# Patient Record
Sex: MALE | Race: WHITE | URBAN - METROPOLITAN AREA
[De-identification: names, ages, dates, MRNs, and addresses within clinical notes are randomized per-mention and may not be internally consistent; named-entity substitution may affect disease eponyms.]

---

## 2017-04-03 ENCOUNTER — HOSPITAL ENCOUNTER (OUTPATIENT)
Dept: GENERAL RADIOLOGY | Age: 67
Discharge: HOME | End: 2017-04-03
Attending: FAMILY MEDICINE
Payer: MEDICARE

## 2017-04-03 ENCOUNTER — OFFICE VISIT (OUTPATIENT)
Dept: FAMILY MEDICINE CLINIC | Facility: CLINIC | Age: 67
End: 2017-04-03

## 2017-04-03 VITALS
HEART RATE: 88 BPM | TEMPERATURE: 98 F | WEIGHT: 230 LBS | HEIGHT: 64.5 IN | BODY MASS INDEX: 38.79 KG/M2 | DIASTOLIC BLOOD PRESSURE: 90 MMHG | SYSTOLIC BLOOD PRESSURE: 164 MMHG | RESPIRATION RATE: 16 BRPM

## 2017-04-03 DIAGNOSIS — M54.5 LOW BACK PAIN, UNSPECIFIED BACK PAIN LATERALITY, UNSPECIFIED CHRONICITY, WITH SCIATICA PRESENCE UNSPECIFIED: Primary | ICD-10-CM

## 2017-04-03 DIAGNOSIS — M54.5 LOW BACK PAIN, UNSPECIFIED BACK PAIN LATERALITY, UNSPECIFIED CHRONICITY, WITH SCIATICA PRESENCE UNSPECIFIED: ICD-10-CM

## 2017-04-03 DIAGNOSIS — K40.90 UNILATERAL INGUINAL HERNIA WITHOUT OBSTRUCTION OR GANGRENE, RECURRENCE NOT SPECIFIED: ICD-10-CM

## 2017-04-03 DIAGNOSIS — K59.00 CONSTIPATION, UNSPECIFIED CONSTIPATION TYPE: ICD-10-CM

## 2017-04-03 DIAGNOSIS — I10 ESSENTIAL HYPERTENSION: ICD-10-CM

## 2017-04-03 DIAGNOSIS — R35.0 FREQUENCY OF URINATION: ICD-10-CM

## 2017-04-03 PROCEDURE — 99214 OFFICE O/P EST MOD 30 MIN: CPT | Performed by: FAMILY MEDICINE

## 2017-04-03 PROCEDURE — 72110 X-RAY EXAM L-2 SPINE 4/>VWS: CPT

## 2017-04-03 PROCEDURE — 81003 URINALYSIS AUTO W/O SCOPE: CPT | Performed by: FAMILY MEDICINE

## 2017-04-03 RX ORDER — LISINOPRIL 10 MG/1
1 TABLET ORAL DAILY
COMMUNITY
Start: 2012-10-11 | End: 2017-04-03

## 2017-04-03 RX ORDER — TRAMADOL HYDROCHLORIDE 50 MG/1
1 TABLET ORAL EVERY 6 HOURS PRN
COMMUNITY
Start: 2011-05-17 | End: 2017-04-03

## 2017-04-03 RX ORDER — HYDROCHLOROTHIAZIDE 12.5 MG/1
1 TABLET ORAL DAILY
COMMUNITY
End: 2017-04-28

## 2017-04-03 RX ORDER — LISINOPRIL 20 MG/1
20 TABLET ORAL DAILY
Qty: 90 TABLET | Refills: 3 | Status: SHIPPED | OUTPATIENT
Start: 2017-04-03 | End: 2017-04-28

## 2017-04-03 NOTE — PROGRESS NOTES
[facility] [place]  PROGRESS NOTE  Chief Complaint:   Patient presents with:  Low Back Pain      HPI:   This is a 61year old male coming in for low back pain for the past year. Occasionally has pain going down into the left leg.   No numbness o SYSTEMS:   CONSTITUTIONAL:  Denies unusual weight gain/loss. EENT:  Eyes:  Denies eye pain, visual loss, blurred vision, double vision or yellow sclerae. Ears, Nose, Throat:  Denies hearing loss, sneezing, congestion, runny nose or sore throat.   Mario Alexander back pain laterality, unspecified chronicity, with sciatica presence unspecified  -     XR LUMBAR SPINE (MIN 4 VIEWS) (CPT=72110);  Future    Frequency of urination  -     Urine Dip, auto without Micro    Unilateral inguinal hernia without obstruction or ga

## 2017-04-03 NOTE — PATIENT INSTRUCTIONS
1.  Low back pain: Physical therapy referral.  Recheck in 3 weeks. 2.  Inguinal hernia: Needs to see a surgeon. Would recommend Dr. Kristian Alaniz. Sister will call for an appointment.   This inguinal hernia may be causing some constipation and possibly some ur

## 2017-04-07 ENCOUNTER — TELEPHONE (OUTPATIENT)
Dept: FAMILY MEDICINE CLINIC | Facility: CLINIC | Age: 67
End: 2017-04-07

## 2017-04-14 ENCOUNTER — TELEPHONE (OUTPATIENT)
Dept: FAMILY MEDICINE CLINIC | Facility: CLINIC | Age: 67
End: 2017-04-14

## 2017-04-28 ENCOUNTER — OFFICE VISIT (OUTPATIENT)
Dept: FAMILY MEDICINE CLINIC | Facility: CLINIC | Age: 67
End: 2017-04-28

## 2017-04-28 ENCOUNTER — HOSPITAL ENCOUNTER (OUTPATIENT)
Dept: GENERAL RADIOLOGY | Age: 67
Discharge: HOME | End: 2017-04-28
Attending: FAMILY MEDICINE
Payer: MEDICARE

## 2017-04-28 ENCOUNTER — LAB ENCOUNTER (OUTPATIENT)
Dept: LAB | Age: 67
End: 2017-04-28
Attending: FAMILY MEDICINE
Payer: MEDICARE

## 2017-04-28 VITALS
HEART RATE: 88 BPM | HEIGHT: 64.5 IN | SYSTOLIC BLOOD PRESSURE: 160 MMHG | DIASTOLIC BLOOD PRESSURE: 92 MMHG | RESPIRATION RATE: 20 BRPM | TEMPERATURE: 98 F | WEIGHT: 233 LBS | BODY MASS INDEX: 39.3 KG/M2

## 2017-04-28 DIAGNOSIS — Z00.00 HEALTH CARE MAINTENANCE: Primary | ICD-10-CM

## 2017-04-28 DIAGNOSIS — R19.5 HEME POSITIVE STOOL: ICD-10-CM

## 2017-04-28 DIAGNOSIS — D64.9 ANEMIA, UNSPECIFIED TYPE: ICD-10-CM

## 2017-04-28 DIAGNOSIS — Z00.00 HEALTH CARE MAINTENANCE: ICD-10-CM

## 2017-04-28 DIAGNOSIS — N47.1 PHIMOSIS: ICD-10-CM

## 2017-04-28 DIAGNOSIS — Z01.818 PREOP EXAMINATION: ICD-10-CM

## 2017-04-28 DIAGNOSIS — K40.90 UNILATERAL INGUINAL HERNIA WITHOUT OBSTRUCTION OR GANGRENE, RECURRENCE NOT SPECIFIED: ICD-10-CM

## 2017-04-28 DIAGNOSIS — R73.9 HYPERGLYCEMIA: ICD-10-CM

## 2017-04-28 DIAGNOSIS — I10 ESSENTIAL HYPERTENSION: ICD-10-CM

## 2017-04-28 PROCEDURE — 82607 VITAMIN B-12: CPT

## 2017-04-28 PROCEDURE — 80053 COMPREHEN METABOLIC PANEL: CPT

## 2017-04-28 PROCEDURE — 83036 HEMOGLOBIN GLYCOSYLATED A1C: CPT

## 2017-04-28 PROCEDURE — 82746 ASSAY OF FOLIC ACID SERUM: CPT

## 2017-04-28 PROCEDURE — 83550 IRON BINDING TEST: CPT

## 2017-04-28 PROCEDURE — 82728 ASSAY OF FERRITIN: CPT

## 2017-04-28 PROCEDURE — 99214 OFFICE O/P EST MOD 30 MIN: CPT | Performed by: FAMILY MEDICINE

## 2017-04-28 PROCEDURE — 84443 ASSAY THYROID STIM HORMONE: CPT

## 2017-04-28 PROCEDURE — 83540 ASSAY OF IRON: CPT

## 2017-04-28 PROCEDURE — 71020 XR CHEST PA + LAT CHEST (CPT=71020): CPT

## 2017-04-28 PROCEDURE — 85025 COMPLETE CBC W/AUTO DIFF WBC: CPT

## 2017-04-28 PROCEDURE — 93000 ELECTROCARDIOGRAM COMPLETE: CPT | Performed by: FAMILY MEDICINE

## 2017-04-28 PROCEDURE — G0439 PPPS, SUBSEQ VISIT: HCPCS | Performed by: FAMILY MEDICINE

## 2017-04-28 RX ORDER — LISINOPRIL 20 MG/1
20 TABLET ORAL 2 TIMES DAILY
Qty: 180 TABLET | Refills: 3 | Status: SHIPPED | OUTPATIENT
Start: 2017-04-28 | End: 2017-06-05

## 2017-04-28 NOTE — PROGRESS NOTES
[facility] [place]  PROGRESS NOTE  Chief Complaint:   Patient presents with:  Physical      HPI:   This is a 61year old male coming in for general health check but also preoperative exam and recheck of problems.   Patient recently was seen by  Alcohol Use: No              Family History:  No family history on file.   Allergies:    No Known Allergies        Other                       Comment:Other reaction(s): Swelling             Peanuts: tongue swells             Fats: tongue swells (pork, lesions or ulcerations, good dentition. NECK: Supple, no CLAD, no JVD, no thyromegaly. SKIN: No rashes, no skin lesion, no bruising, good turgor. HEART:  Regular rate and rhythm, no murmurs, rubs or gallops.   LUNGS: Clear to auscultation bilterally, no with Dr. Nathalia Coffey as patient does have phimosis. I discussed his case with Dr. Nathalia Coffey today. He will see him hopefully this next week. Increase lisinopril to 20 mg twice daily and recheck appointment 2 weeks to better control blood pressure prior to surgery.

## 2017-04-28 NOTE — PATIENT INSTRUCTIONS
Scheduled for inguinal hernia surgery with Dr. Juana Dover on May 16. In preparation for the surgery an EKG was was done today which was normal.  A chest x-ray and blood tests have been also done with results pending. Physical exam was done today.   This incl

## 2017-05-01 ENCOUNTER — TELEPHONE (OUTPATIENT)
Dept: FAMILY MEDICINE CLINIC | Facility: CLINIC | Age: 67
End: 2017-05-01

## 2017-05-01 NOTE — TELEPHONE ENCOUNTER
Future Appointments  Date Time Provider Ila Wilson   5/12/2017 9:00 AM Mauricio Delaney MD [facility] [place] [facility]     Informed sister of the results of blood work. Faxed to Dr. Obey Roman and Dr. Armenta Samaritan office.  Sister expressed understanding and thank

## 2017-05-01 NOTE — TELEPHONE ENCOUNTER
----- Message from Miguelina Matta MD sent at 5/1/2017  1:48 PM CDT -----  Labs are normal with the exception of a slightly decreased hemoglobin and low iron levels.   Patient did have positive Hemoccult test on rectal.  I would like patient to start [facility]

## 2017-05-12 ENCOUNTER — OFFICE VISIT (OUTPATIENT)
Dept: FAMILY MEDICINE CLINIC | Facility: CLINIC | Age: 67
End: 2017-05-12

## 2017-05-12 VITALS
TEMPERATURE: 98 F | SYSTOLIC BLOOD PRESSURE: 150 MMHG | RESPIRATION RATE: 18 BRPM | DIASTOLIC BLOOD PRESSURE: 80 MMHG | BODY MASS INDEX: 38.16 KG/M2 | HEIGHT: 65.25 IN | WEIGHT: 231.81 LBS | HEART RATE: 80 BPM

## 2017-05-12 DIAGNOSIS — I10 ESSENTIAL HYPERTENSION: Primary | ICD-10-CM

## 2017-05-12 DIAGNOSIS — N47.1 PHIMOSIS: ICD-10-CM

## 2017-05-12 DIAGNOSIS — K40.90 UNILATERAL INGUINAL HERNIA WITHOUT OBSTRUCTION OR GANGRENE, RECURRENCE NOT SPECIFIED: ICD-10-CM

## 2017-05-12 DIAGNOSIS — Z01.818 PREOP EXAMINATION: ICD-10-CM

## 2017-05-12 PROCEDURE — 99214 OFFICE O/P EST MOD 30 MIN: CPT | Performed by: FAMILY MEDICINE

## 2017-05-12 RX ORDER — AMLODIPINE BESYLATE 5 MG/1
5 TABLET ORAL DAILY
Qty: 30 TABLET | Refills: 6 | Status: SHIPPED | OUTPATIENT
Start: 2017-05-12 | End: 2017-06-05

## 2017-05-12 NOTE — PROGRESS NOTES
[facility] [place]  PROGRESS NOTE  Chief Complaint:   Patient presents with:  Blood Pressure: re check b/p before hernia surgery      HPI:   This is a 61year old male coming in for ***      Results for orders placed or performed in visit on 04/ 0. 10-0.60 x10(3) uL   Eosinophil Absolute 0.44 (H) 0.00-0.30 x10(3) uL   Basophil Absolute 0.06 0.00-0.10 x10(3) uL   Immature Granulocyte Absolute 0.05 0.00-1.00 x10(3) uL   Neutrophil % 58.0 %   Lymphocyte % 24.1 %   Monocyte % 10.3 %   Eosinophil % 6.1 bruising, good turgor. HEART:  Regular rate and rhythm, no murmurs, rubs or gallops. LUNGS: Clear to auscultation bilterally, no rales/rhonchi/wheezing. CHEST: No tenderness.   ABDOMEN:  Soft, nondistended, nontender  EXTREMITIES:  No edema      ASSESSME

## 2017-05-12 NOTE — PATIENT INSTRUCTIONS
Continue with lisinopril 20 mg twice daily. Add amlodipine 5 mg daily. Will proceed with hernia surgery next week.

## 2017-05-12 NOTE — PROGRESS NOTES
[facility] [place]  PROGRESS NOTE  Chief Complaint:   Patient presents with:  Blood Pressure: re check b/p before hernia surgery      HPI:   This is a 61year old male coming in for preop for inguinal hernia and also recheck of blood pressure. 8. 7-24.0 ng/mL   -ASSAY, THYROID STIM HORMONE   Result Value Ref Range   TSH 1.060 0.350-5.500 mIU/mL   -CBC W/ DIFFERENTIAL   Result Value Ref Range   WBC 7.3 4.0-13.0 x10(3) uL   RBC 4.62 4.30-5.70 x10(6)uL   HGB 12.6 (L) 13.0-17.0 g/dL   HCT 40.0 37.0-5 Nose, Throat:  Denies hearing loss, sneezing, congestion, runny nose or sore throat. INTEGUMENTARY:  Denies rashes, itching, skin lesion, or excessive skin dryness.   CARDIOVASCULAR: See HPI  RESPIRATORY:  Denies shortness of breath, wheezing, cough or spu mg total) by mouth daily. PLAN: #1 preop: I see no contraindication proceeding with inguinal hernia repair. I discussed with  this morning.   It appears that it will be very difficult to coordinate repair of the inguinal hernia and repair

## 2017-05-19 ENCOUNTER — TELEPHONE (OUTPATIENT)
Dept: FAMILY MEDICINE CLINIC | Facility: CLINIC | Age: 67
End: 2017-05-19

## 2017-05-30 ENCOUNTER — TELEPHONE (OUTPATIENT)
Dept: FAMILY MEDICINE CLINIC | Facility: CLINIC | Age: 67
End: 2017-05-30

## 2017-05-30 NOTE — TELEPHONE ENCOUNTER
Patient's wife YENNY [facility] states patient is going to be having a circumcision done with Dr. Uvaldo Diaz on 6/27/2017. Wife states they were informed that clearance is needed by Dr. Estephanie Blandon.   Wife wondering if an appt is needed or if a note can be written as patient wa

## 2017-05-31 NOTE — TELEPHONE ENCOUNTER
Patient needs to be seen within 30 days for surgical clearance. So, I will need to see the patient for short visit prior to the surgery.   Please notify the patient's sister

## 2017-05-31 NOTE — TELEPHONE ENCOUNTER
Spoke with Mata Lopez  This is patient's sister NOT his wife  Made appt for patient to see Dr. Abe Vyas this Friday morning 6/2/17  Maribel states if they need to r/s she will call back later today    Mal Ibanez, 05/31/2017, 9:04 AM

## 2017-06-05 ENCOUNTER — OFFICE VISIT (OUTPATIENT)
Dept: FAMILY MEDICINE CLINIC | Facility: CLINIC | Age: 67
End: 2017-06-05

## 2017-06-05 VITALS
HEIGHT: 65.5 IN | DIASTOLIC BLOOD PRESSURE: 86 MMHG | BODY MASS INDEX: 37.17 KG/M2 | TEMPERATURE: 98 F | SYSTOLIC BLOOD PRESSURE: 136 MMHG | WEIGHT: 225.81 LBS | RESPIRATION RATE: 20 BRPM | HEART RATE: 88 BPM

## 2017-06-05 DIAGNOSIS — I10 ESSENTIAL HYPERTENSION: ICD-10-CM

## 2017-06-05 DIAGNOSIS — N47.1 PHIMOSIS: Primary | ICD-10-CM

## 2017-06-05 DIAGNOSIS — R35.0 FREQUENCY OF URINATION: ICD-10-CM

## 2017-06-05 PROBLEM — K40.90 UNILATERAL INGUINAL HERNIA WITHOUT OBSTRUCTION OR GANGRENE: Status: RESOLVED | Noted: 2017-04-28 | Resolved: 2017-06-05

## 2017-06-05 PROCEDURE — 99214 OFFICE O/P EST MOD 30 MIN: CPT | Performed by: FAMILY MEDICINE

## 2017-06-05 RX ORDER — AMLODIPINE BESYLATE 5 MG/1
5 TABLET ORAL DAILY
Qty: 90 TABLET | Refills: 3 | Status: SHIPPED | OUTPATIENT
Start: 2017-06-05 | End: 2018-06-24

## 2017-06-05 RX ORDER — LISINOPRIL 20 MG/1
20 TABLET ORAL 2 TIMES DAILY
Qty: 180 TABLET | Refills: 3 | Status: SHIPPED | OUTPATIENT
Start: 2017-06-05 | End: 2018-06-06

## 2017-06-05 NOTE — PROGRESS NOTES
[facility] [place]  PROGRESS NOTE  Chief Complaint:   Patient presents with:  Pre-Op Exam      HPI:   This is a 61year old male coming in for recheck of problems and preop evaluation for urological surgery.   Patient has seen Dr. Gelacio Hammond for phimosi Result Value Ref Range   TSH 1.060 0.350-5.500 mIU/mL   -CBC W/ DIFFERENTIAL   Result Value Ref Range   WBC 7.3 4.0-13.0 x10(3) uL   RBC 4.62 4.30-5.70 x10(6)uL   HGB 12.6 (L) 13.0-17.0 g/dL   HCT 40.0 37.0-53.0 %   .0 150.0-450.0 10(3)uL   MCV 86 congestion, runny nose or sore throat. INTEGUMENTARY:  Denies rashes, itching, skin lesion, or excessive skin dryness.   CARDIOVASCULAR:  Denies chest pain, chest pressure, chest discomfort, palpitations, edema, dyspnea on exertion or at rest.  RESPIRATORY No edema, no cyanosis,       ASSESSMENT AND PLAN:   Mel Melgar was seen today for pre-op exam.    Diagnoses and all orders for this visit:    Phimosis    Essential hypertension    Frequency of urination    Other orders  -     AmLODIPine Besylate 5 MG Oral Tab;

## 2017-09-06 ENCOUNTER — TELEPHONE (OUTPATIENT)
Dept: FAMILY MEDICINE CLINIC | Facility: CLINIC | Age: 67
End: 2017-09-06

## 2017-09-06 NOTE — TELEPHONE ENCOUNTER
I left a message for Madelyn Londons to let him know that he no showed for his appointment this morning and to call office if he needs to reschedule. I also left in the message our No Show Policy , next no show they will be charged $50.00.

## 2018-06-06 ENCOUNTER — APPOINTMENT (OUTPATIENT)
Dept: LAB | Age: 68
End: 2018-06-06
Attending: FAMILY MEDICINE
Payer: MEDICARE

## 2018-06-06 ENCOUNTER — OFFICE VISIT (OUTPATIENT)
Dept: FAMILY MEDICINE CLINIC | Facility: CLINIC | Age: 68
End: 2018-06-06

## 2018-06-06 VITALS
DIASTOLIC BLOOD PRESSURE: 78 MMHG | HEART RATE: 64 BPM | WEIGHT: 226.13 LBS | BODY MASS INDEX: 37.67 KG/M2 | SYSTOLIC BLOOD PRESSURE: 136 MMHG | RESPIRATION RATE: 18 BRPM | TEMPERATURE: 97 F | HEIGHT: 65 IN

## 2018-06-06 DIAGNOSIS — D64.9 ANEMIA, UNSPECIFIED TYPE: ICD-10-CM

## 2018-06-06 DIAGNOSIS — Z00.00 HEALTH CARE MAINTENANCE: Primary | ICD-10-CM

## 2018-06-06 DIAGNOSIS — R35.0 FREQUENCY OF URINATION: ICD-10-CM

## 2018-06-06 DIAGNOSIS — R73.09 OTHER ABNORMAL GLUCOSE: ICD-10-CM

## 2018-06-06 DIAGNOSIS — N40.1 BENIGN PROSTATIC HYPERPLASIA WITH LOWER URINARY TRACT SYMPTOMS, SYMPTOM DETAILS UNSPECIFIED: ICD-10-CM

## 2018-06-06 DIAGNOSIS — I10 ESSENTIAL HYPERTENSION: ICD-10-CM

## 2018-06-06 PROCEDURE — 82728 ASSAY OF FERRITIN: CPT | Performed by: FAMILY MEDICINE

## 2018-06-06 PROCEDURE — 80053 COMPREHEN METABOLIC PANEL: CPT | Performed by: FAMILY MEDICINE

## 2018-06-06 PROCEDURE — 80061 LIPID PANEL: CPT | Performed by: FAMILY MEDICINE

## 2018-06-06 PROCEDURE — 85025 COMPLETE CBC W/AUTO DIFF WBC: CPT | Performed by: FAMILY MEDICINE

## 2018-06-06 PROCEDURE — 90670 PCV13 VACCINE IM: CPT | Performed by: FAMILY MEDICINE

## 2018-06-06 PROCEDURE — 83036 HEMOGLOBIN GLYCOSYLATED A1C: CPT | Performed by: FAMILY MEDICINE

## 2018-06-06 PROCEDURE — 83550 IRON BINDING TEST: CPT | Performed by: FAMILY MEDICINE

## 2018-06-06 PROCEDURE — 84153 ASSAY OF PSA TOTAL: CPT | Performed by: FAMILY MEDICINE

## 2018-06-06 PROCEDURE — 84443 ASSAY THYROID STIM HORMONE: CPT | Performed by: FAMILY MEDICINE

## 2018-06-06 PROCEDURE — G0439 PPPS, SUBSEQ VISIT: HCPCS | Performed by: FAMILY MEDICINE

## 2018-06-06 PROCEDURE — G0009 ADMIN PNEUMOCOCCAL VACCINE: HCPCS | Performed by: FAMILY MEDICINE

## 2018-06-06 PROCEDURE — 83540 ASSAY OF IRON: CPT | Performed by: FAMILY MEDICINE

## 2018-06-06 PROCEDURE — 36415 COLL VENOUS BLD VENIPUNCTURE: CPT | Performed by: FAMILY MEDICINE

## 2018-06-06 PROCEDURE — 81003 URINALYSIS AUTO W/O SCOPE: CPT | Performed by: FAMILY MEDICINE

## 2018-06-06 RX ORDER — LISINOPRIL 20 MG/1
20 TABLET ORAL 2 TIMES DAILY
Qty: 180 TABLET | Refills: 2 | Status: SHIPPED | OUTPATIENT
Start: 2018-06-06 | End: 2019-04-09

## 2018-06-06 NOTE — PROGRESS NOTES
[facility] [place]  PROGRESS NOTE  Chief Complaint:   Patient presents with:  Physical      HPI:   This is a 74year old male coming in for general wellness check and recheck of problems of. Patient takes lisinopril for hypertension.   He feels THYROID STIM HORMONE   Result Value Ref Range   TSH 1.060 0.350 - 5.500 mIU/mL   -CBC W/ DIFFERENTIAL   Result Value Ref Range   WBC 7.3 4.0 - 13.0 x10(3) uL   RBC 4.62 4.30 - 5.70 x10(6)uL   HGB 12.6 (L) 13.0 - 17.0 g/dL   HCT 40.0 37.0 - 53.0 %    weight gain/loss,  EENT:  Eyes:  Denies eye pain, visual loss, blurred vision, double vision or yellow sclerae. Ears, Nose, Throat:  Denies hearing loss, sneezing, congestion, runny nose or sore throat.   INTEGUMENTARY:  Denies rashes, itching, skin lesion, rhythm, no murmurs, rubs or gallops. LUNGS: Clear to auscultation bilterally, no rales/rhonchi/wheezing. CHEST: No tenderness. ABDOMEN:  Soft, nondistended, nontender, no masses, no hepatosplenomegaly. BACK: No tenderness, .   EXTREMITIES:  No edema, no 04/28/2018  Annual Depression Screen due on 05/12/2018    Patient/Caregiver Education: Patient/Caregiver Education: There are no barriers to learning. Medical education done. Outcome: Patient verbalizes understanding.  Patient is notified to call with any

## 2018-06-06 NOTE — PATIENT INSTRUCTIONS
1.  Continue with blood pressure medication. 2.  Prevnar (pneumonia vaccine) given today. This is routine after the age of 72. Will need 1 more pneumonia vaccine in 1 year. 3.  Labs drawn today and results will be available tomorrow.   Will call patient

## 2018-06-07 ENCOUNTER — TELEPHONE (OUTPATIENT)
Dept: FAMILY MEDICINE CLINIC | Facility: CLINIC | Age: 68
End: 2018-06-07

## 2018-06-07 NOTE — TELEPHONE ENCOUNTER
----- Message from Miguelina Matta MD sent at 6/7/2018  7:32 AM CDT -----  Labs are improved from last year. A1c is better at 5.8, was 6.0 which means that his pre-diabetes/mild diabetes is better controlled.   His blood count/hemoglobin is improved from

## 2018-06-25 RX ORDER — PRASUGREL 10 MG/1
TABLET, FILM COATED ORAL
Status: COMPLETED
Start: 2018-06-25 | End: 2018-06-25

## 2018-06-25 RX ORDER — MIDAZOLAM HYDROCHLORIDE 1 MG/ML
INJECTION INTRAMUSCULAR; INTRAVENOUS
Status: COMPLETED
Start: 2018-06-25 | End: 2018-06-25

## 2018-06-25 RX ORDER — HEPARIN SODIUM 5000 [USP'U]/ML
INJECTION, SOLUTION INTRAVENOUS; SUBCUTANEOUS
Status: COMPLETED
Start: 2018-06-25 | End: 2018-06-25

## 2018-06-25 RX ORDER — AMLODIPINE BESYLATE 5 MG/1
TABLET ORAL
Qty: 90 TABLET | Refills: 2 | Status: SHIPPED | OUTPATIENT
Start: 2018-06-25 | End: 2019-04-09

## 2018-06-25 NOTE — TELEPHONE ENCOUNTER
Future appt:    Last Appointment:  6/6/2018    Cholesterol, Total (mg/dL)   Date Value   06/06/2018 159   ----------  HDL Cholesterol (mg/dL)   Date Value   06/06/2018 32 (L)   ----------  LDL Cholesterol (mg/dL)   Date Value   06/06/2018 84   ----------

## 2019-04-09 ENCOUNTER — TELEPHONE (OUTPATIENT)
Dept: FAMILY MEDICINE CLINIC | Facility: CLINIC | Age: 69
End: 2019-04-09

## 2019-04-09 RX ORDER — AMLODIPINE BESYLATE 5 MG/1
5 TABLET ORAL
Qty: 90 TABLET | Refills: 0 | Status: SHIPPED | OUTPATIENT
Start: 2019-04-09 | End: 2019-07-14

## 2019-04-09 RX ORDER — LISINOPRIL 20 MG/1
20 TABLET ORAL 2 TIMES DAILY
Qty: 180 TABLET | Refills: 0 | Status: SHIPPED | OUTPATIENT
Start: 2019-04-09 | End: 2019-07-14

## 2019-04-09 NOTE — TELEPHONE ENCOUNTER
Future appt:    Last Appointment:  6/6/18 physical with Steph Joshua; no f/u noted  Cholesterol, Total (mg/dL)   Date Value   06/06/2018 159     HDL Cholesterol (mg/dL)   Date Value   06/06/2018 32 (L)     LDL Cholesterol (mg/dL)   Date Value   06/06/2018 84

## 2019-04-09 NOTE — TELEPHONE ENCOUNTER
Patient states he takes both amlodipine and lisinopril. States he needs a refill of both to [facility]. Patient states he will call to set up a physical appointment with Dr. Copeland Doppelganger for sometime in June.

## 2019-06-25 ENCOUNTER — LAB ENCOUNTER (OUTPATIENT)
Dept: LAB | Age: 69
End: 2019-06-25
Attending: FAMILY MEDICINE
Payer: MEDICARE

## 2019-06-25 ENCOUNTER — OFFICE VISIT (OUTPATIENT)
Dept: FAMILY MEDICINE CLINIC | Facility: CLINIC | Age: 69
End: 2019-06-25
Payer: MEDICARE

## 2019-06-25 VITALS
RESPIRATION RATE: 18 BRPM | DIASTOLIC BLOOD PRESSURE: 82 MMHG | BODY MASS INDEX: 37.74 KG/M2 | HEIGHT: 65 IN | TEMPERATURE: 97 F | SYSTOLIC BLOOD PRESSURE: 138 MMHG | WEIGHT: 226.5 LBS | HEART RATE: 88 BPM

## 2019-06-25 DIAGNOSIS — D64.9 ANEMIA, UNSPECIFIED TYPE: ICD-10-CM

## 2019-06-25 DIAGNOSIS — E78.49 FAMILIAL MULTIPLE LIPOPROTEIN-TYPE HYPERLIPIDEMIA: ICD-10-CM

## 2019-06-25 DIAGNOSIS — Z13.6 SCREENING FOR CARDIOVASCULAR CONDITION: ICD-10-CM

## 2019-06-25 DIAGNOSIS — Z11.59 NEED FOR HEPATITIS C SCREENING TEST: ICD-10-CM

## 2019-06-25 DIAGNOSIS — I10 BENIGN ESSENTIAL HYPERTENSION: ICD-10-CM

## 2019-06-25 DIAGNOSIS — R35.1 BPH ASSOCIATED WITH NOCTURIA: ICD-10-CM

## 2019-06-25 DIAGNOSIS — Z00.00 ENCOUNTER FOR ANNUAL HEALTH EXAMINATION: Primary | ICD-10-CM

## 2019-06-25 DIAGNOSIS — M15.9 PRIMARY OSTEOARTHRITIS INVOLVING MULTIPLE JOINTS: ICD-10-CM

## 2019-06-25 DIAGNOSIS — Z12.5 SCREENING FOR MALIGNANT NEOPLASM OF PROSTATE: ICD-10-CM

## 2019-06-25 DIAGNOSIS — Z12.11 SCREENING FOR COLON CANCER: ICD-10-CM

## 2019-06-25 DIAGNOSIS — F79 INTELLECTUAL DISABILITY: ICD-10-CM

## 2019-06-25 DIAGNOSIS — Z12.5 SCREENING FOR PROSTATE CANCER: ICD-10-CM

## 2019-06-25 DIAGNOSIS — M51.16 LUMBAR DISC DISEASE WITH RADICULOPATHY: ICD-10-CM

## 2019-06-25 DIAGNOSIS — E66.09 CLASS 2 OBESITY DUE TO EXCESS CALORIES WITHOUT SERIOUS COMORBIDITY WITH BODY MASS INDEX (BMI) OF 37.0 TO 37.9 IN ADULT: ICD-10-CM

## 2019-06-25 DIAGNOSIS — Z23 NEED FOR VACCINATION: ICD-10-CM

## 2019-06-25 DIAGNOSIS — N40.1 BPH ASSOCIATED WITH NOCTURIA: ICD-10-CM

## 2019-06-25 PROCEDURE — G0009 ADMIN PNEUMOCOCCAL VACCINE: HCPCS | Performed by: FAMILY MEDICINE

## 2019-06-25 PROCEDURE — 84443 ASSAY THYROID STIM HORMONE: CPT

## 2019-06-25 PROCEDURE — 80053 COMPREHEN METABOLIC PANEL: CPT

## 2019-06-25 PROCEDURE — 86803 HEPATITIS C AB TEST: CPT

## 2019-06-25 PROCEDURE — 85025 COMPLETE CBC W/AUTO DIFF WBC: CPT

## 2019-06-25 PROCEDURE — 90732 PPSV23 VACC 2 YRS+ SUBQ/IM: CPT | Performed by: FAMILY MEDICINE

## 2019-06-25 PROCEDURE — 99213 OFFICE O/P EST LOW 20 MIN: CPT | Performed by: FAMILY MEDICINE

## 2019-06-25 PROCEDURE — G0439 PPPS, SUBSEQ VISIT: HCPCS | Performed by: FAMILY MEDICINE

## 2019-06-25 PROCEDURE — 36415 COLL VENOUS BLD VENIPUNCTURE: CPT

## 2019-06-25 PROCEDURE — 80061 LIPID PANEL: CPT

## 2019-06-25 NOTE — PATIENT INSTRUCTIONS
Recommended Websites for Advanced Directives    [employer]. org/publications/Documents/personal_dec. pdf  An information packet, including necessary form from the [address] website. http://www. [place]state. [place]/public/books/adv

## 2019-06-25 NOTE — PROGRESS NOTES
[address]  PROGRESS NOTE  Chief Complaint:   Patient presents with:  Physical  Establish Care: Former [facility] pt      HPI:   This is a 79year old male coming in for his annual wellness visit. He said that he is feeling good.   He de Result Value Ref Range    TSH 0.473 0.350 - 5.500 mIU/mL   PSA   Result Value Ref Range    PSA 0.35 0.01 - 4.00 ng/mL   URINALYSIS, ROUTINE   Result Value Ref Range    Urine Color Yellow Yellow    Clarity Urine Clear Clear    Spec Gravity 1.011 1.001 - 1 History:  Social History    Tobacco Use      Smoking status: Never Smoker      Smokeless tobacco: Never Used    Alcohol use: No      Alcohol/week: 0.0 oz    Drug use: No    Family History:  Family History   Problem Relation Age of Onset   • Other (Lung can or anxiety. ENDOCRINOLOGIC:  Denies excessive sweating, cold or heat intolerance, polyuria or polydipsia. ALLERGIES:  Denies allergic response, history of asthma, sneezing, hives, eczema or rhinitis.      EXAM:   /82 (BP Location: Right arm, Patient is due for his pneumococcal vaccine today. After this is completed he will not need any additional pneumonia vaccines. He is due for a colonoscopy so referral was made to Dr. Emi Araujo. Check fasting labs today.     2. Benign essential hypertension  His bl SCREEN/DIGITAL  - PSA SCREEN; Future    11. Screening for cardiovascular condition  We will check his lipids today. - LIPID PANEL; Future    12.  Screening for prostate cancer  He is due for prostate cancer screening.  - OFFICE/OUTPT VISIT,EST,LEVL III  -

## 2019-06-26 ENCOUNTER — TELEPHONE (OUTPATIENT)
Dept: FAMILY MEDICINE CLINIC | Facility: CLINIC | Age: 69
End: 2019-06-26

## 2019-06-26 NOTE — TELEPHONE ENCOUNTER
----- Message from Fabiana Reddy MD sent at 6/26/2019  8:07 AM CDT -----  Please call Jen Valdes. His labs look very good. He is testing for hepatitis C is negative. His cholesterol is normal at 181. His blood sugar is 105.   This is better than 1 year ago

## 2019-07-15 RX ORDER — AMLODIPINE BESYLATE 5 MG/1
5 TABLET ORAL
Qty: 90 TABLET | Refills: 0 | Status: SHIPPED | OUTPATIENT
Start: 2019-07-15 | End: 2019-10-18

## 2019-07-15 RX ORDER — LISINOPRIL 20 MG/1
20 TABLET ORAL 2 TIMES DAILY
Qty: 180 TABLET | Refills: 0 | Status: SHIPPED | OUTPATIENT
Start: 2019-07-15 | End: 2019-10-16

## 2019-07-15 NOTE — TELEPHONE ENCOUNTER
Future appt:    Last Appointment: 6/25/19 physical ; recheck 1 year  Cholesterol, Total (mg/dL)   Date Value   06/25/2019 181     HDL Cholesterol (mg/dL)   Date Value   06/25/2019 34 (L)     LDL Cholesterol (mg/dL)   Date Value   06/25/2019 91     Triglyce

## 2019-10-16 RX ORDER — LISINOPRIL 20 MG/1
TABLET ORAL
Qty: 180 TABLET | Refills: 1 | Status: SHIPPED | OUTPATIENT
Start: 2019-10-16 | End: 2020-04-30

## 2019-10-16 NOTE — TELEPHONE ENCOUNTER
Future appt:    Last Appointment:  6/25/2019  Cholesterol, Total (mg/dL)   Date Value   06/25/2019 181     HDL Cholesterol (mg/dL)   Date Value   06/25/2019 34 (L)     LDL Cholesterol (mg/dL)   Date Value   06/25/2019 91     Triglycerides (mg/dL)   Date Va

## 2019-10-18 RX ORDER — AMLODIPINE BESYLATE 5 MG/1
5 TABLET ORAL
Qty: 90 TABLET | Refills: 1 | Status: SHIPPED | OUTPATIENT
Start: 2019-10-18 | End: 2020-06-04

## 2020-01-30 ENCOUNTER — OFFICE VISIT (OUTPATIENT)
Dept: FAMILY MEDICINE CLINIC | Facility: CLINIC | Age: 70
End: 2020-01-30
Payer: MEDICARE

## 2020-01-30 VITALS
HEIGHT: 65 IN | SYSTOLIC BLOOD PRESSURE: 138 MMHG | OXYGEN SATURATION: 98 % | BODY MASS INDEX: 36.49 KG/M2 | DIASTOLIC BLOOD PRESSURE: 86 MMHG | RESPIRATION RATE: 22 BRPM | HEART RATE: 91 BPM | TEMPERATURE: 98 F | WEIGHT: 219 LBS

## 2020-01-30 DIAGNOSIS — M79.604 PAIN IN BOTH LOWER EXTREMITIES: Primary | ICD-10-CM

## 2020-01-30 DIAGNOSIS — M79.605 PAIN IN BOTH LOWER EXTREMITIES: Primary | ICD-10-CM

## 2020-01-30 PROCEDURE — 99213 OFFICE O/P EST LOW 20 MIN: CPT | Performed by: FAMILY MEDICINE

## 2020-01-30 NOTE — PATIENT INSTRUCTIONS
Recommend rest, heating pad and aleve. Recommend to rest after every 2 hours at work. Consider physical therapy if no improvement, declined today. Return to clinic in 1-2 weeks if no improvement. Sooner if symptoms gets worse.

## 2020-01-30 NOTE — PROGRESS NOTES
[facility] [place]  PROGRESS NOTE  Chief Complaint:   Patient presents with:  Leg Pain      HPI:   This is a 81year old male presents to clinic complaining of pain in his lower legs bilaterally that has been present for past week.   Patient Fran Chambers fatigue. INTEGUMENTARY:  Denies rashes, itching, skin lesion, or excessive skin dryness.   CARDIOVASCULAR:  Denies chest pain, chest pressure, chest discomfort, palpitations, edema, dyspnea on exertion or at rest.  RESPIRATORY:  Denies shortness of breath, in both lower extremities        Patient Instructions   Recommend rest, heating pad and aleve. Recommend to rest after every 2 hours at work. Consider physical therapy if no improvement, declined today.    Return to clinic in 1-2 weeks if no improvement

## 2020-02-07 ENCOUNTER — OFFICE VISIT (OUTPATIENT)
Dept: FAMILY MEDICINE CLINIC | Facility: CLINIC | Age: 70
End: 2020-02-07
Payer: MEDICARE

## 2020-02-07 VITALS
BODY MASS INDEX: 36.82 KG/M2 | DIASTOLIC BLOOD PRESSURE: 82 MMHG | HEART RATE: 104 BPM | TEMPERATURE: 98 F | WEIGHT: 221 LBS | OXYGEN SATURATION: 98 % | HEIGHT: 65 IN | SYSTOLIC BLOOD PRESSURE: 140 MMHG | RESPIRATION RATE: 16 BRPM

## 2020-02-07 DIAGNOSIS — M79.604 PAIN IN BOTH LOWER EXTREMITIES: ICD-10-CM

## 2020-02-07 DIAGNOSIS — R19.7 DIARRHEA, UNSPECIFIED TYPE: Primary | ICD-10-CM

## 2020-02-07 DIAGNOSIS — M79.605 PAIN IN BOTH LOWER EXTREMITIES: ICD-10-CM

## 2020-02-07 PROCEDURE — 99213 OFFICE O/P EST LOW 20 MIN: CPT | Performed by: NURSE PRACTITIONER

## 2020-02-07 NOTE — PATIENT INSTRUCTIONS
Decrease your coffee intake, drink more water. Try a Gatorade each day as well.    Monitor your diarrhea as symptoms have improved; eat bland foods like bananas, apples, oatmeal, rice to help with diarrhea: Avoid spicy foods and dairy/milk products for a

## 2020-02-07 NOTE — PROGRESS NOTES
[facility] [place]  PROGRESS NOTE  Chief Complaint:   Patient presents with:  Diarrhea  Leg Pain      HPI:   This is a 81year old male coming in for diarrhea. For the last \"few days\" the patient has been having loose bowel movements.    Has Comment:Other reaction(s): Swelling             Peanuts: tongue swells             Fats: tongue swells (pork, ham, )  Current Meds:  Current Outpatient Medications   Medication Sig Dispense Refill   • amLODIPine Besylate 5 MG Oral Tab Take 1 tablet (5 following:    Height as of this encounter: 65\". Weight as of this encounter: 221 lb (100.2 kg).    Wt Readings from Last 6 Encounters:  02/07/20 : 221 lb (100.2 kg)  01/30/20 : 219 lb (99.3 kg)  06/25/19 : 226 lb 8 oz (102.7 kg)  06/06/18 : 226 lb 2 oz to return to work. 2. Pain in both lower extremities  Intermittent calf muscle pain. Reiterated prior instructions to patient received as well as some calf muscle exercises.   Encouraged hydration with water and electrolyte beverages, encouraged patien excuse any grammatical errors. Call my office if you have any questions regarding this note.

## 2020-03-09 ENCOUNTER — TELEPHONE (OUTPATIENT)
Dept: FAMILY MEDICINE CLINIC | Facility: CLINIC | Age: 70
End: 2020-03-09

## 2020-03-09 NOTE — TELEPHONE ENCOUNTER
Sister requested a call back from nurse, states she needs information on a couple of surgeries the patient had done.

## 2020-03-09 NOTE — TELEPHONE ENCOUNTER
Davian Pulido will bring in form from [facility] that information is being requested for appeal.  Kira Vance, 03/09/20, 5:00 PM

## 2020-03-10 NOTE — TELEPHONE ENCOUNTER
Iva Rincon brought in the paperwork she is filling out for  Patient's disability. Provided patient's first and last visit at clinic.   Nicolás Jasso, 03/10/20, 10:28 AM

## 2020-03-11 ENCOUNTER — PATIENT OUTREACH (OUTPATIENT)
Dept: FAMILY MEDICINE CLINIC | Facility: CLINIC | Age: 70
End: 2020-03-11

## 2020-04-30 RX ORDER — LISINOPRIL 20 MG/1
TABLET ORAL
Qty: 60 TABLET | Refills: 0 | Status: SHIPPED | OUTPATIENT
Start: 2020-04-30 | End: 2020-10-29

## 2020-04-30 NOTE — TELEPHONE ENCOUNTER
Future appt:    Last Appointment with provider: 6/25/2019    Last appointment at [facility]:  2/7/2020  Cholesterol, Total (mg/dL)   Date Value   06/25/2019 181     HDL Cholesterol (mg/dL)   Date Value   06/25/2019 34 (L)     LDL Cholesterol (mg/dL)   Da

## 2020-06-04 NOTE — TELEPHONE ENCOUNTER
Future appt:     Last Appointment with provider:  6/25/2019; Return in 1 year (on 6/25/2020).      Last appointment at [facility]:  2/7/2020  Cholesterol, Total (mg/dL)   Date Value   06/25/2019 181     HDL Cholesterol (mg/dL)   Date Value   06/25/2019 34

## 2020-06-05 RX ORDER — AMLODIPINE BESYLATE 5 MG/1
TABLET ORAL
Qty: 90 TABLET | Refills: 1 | Status: SHIPPED | OUTPATIENT
Start: 2020-06-05 | End: 2021-02-10

## 2020-10-16 ENCOUNTER — HOSPITAL ENCOUNTER (OUTPATIENT)
Dept: GENERAL RADIOLOGY | Age: 70
Discharge: HOME | End: 2020-10-16
Attending: NURSE PRACTITIONER
Payer: MEDICAID

## 2020-10-16 ENCOUNTER — OFFICE VISIT (OUTPATIENT)
Dept: FAMILY MEDICINE CLINIC | Facility: CLINIC | Age: 70
End: 2020-10-16
Payer: MEDICARE

## 2020-10-16 VITALS
OXYGEN SATURATION: 98 % | HEART RATE: 107 BPM | DIASTOLIC BLOOD PRESSURE: 84 MMHG | WEIGHT: 222.38 LBS | HEIGHT: 65 IN | TEMPERATURE: 98 F | RESPIRATION RATE: 18 BRPM | BODY MASS INDEX: 37.05 KG/M2 | SYSTOLIC BLOOD PRESSURE: 142 MMHG

## 2020-10-16 DIAGNOSIS — M54.50 ACUTE MIDLINE LOW BACK PAIN WITHOUT SCIATICA: ICD-10-CM

## 2020-10-16 DIAGNOSIS — N40.0 ENLARGED PROSTATE: ICD-10-CM

## 2020-10-16 DIAGNOSIS — M54.50 ACUTE MIDLINE LOW BACK PAIN WITHOUT SCIATICA: Primary | ICD-10-CM

## 2020-10-16 PROCEDURE — 72220 X-RAY EXAM SACRUM TAILBONE: CPT | Performed by: NURSE PRACTITIONER

## 2020-10-16 PROCEDURE — 72110 X-RAY EXAM L-2 SPINE 4/>VWS: CPT | Performed by: NURSE PRACTITIONER

## 2020-10-16 PROCEDURE — 99214 OFFICE O/P EST MOD 30 MIN: CPT | Performed by: NURSE PRACTITIONER

## 2020-10-16 PROCEDURE — 84154 ASSAY OF PSA FREE: CPT | Performed by: NURSE PRACTITIONER

## 2020-10-16 PROCEDURE — 84153 ASSAY OF PSA TOTAL: CPT | Performed by: NURSE PRACTITIONER

## 2020-10-16 PROCEDURE — 3077F SYST BP >= 140 MM HG: CPT | Performed by: NURSE PRACTITIONER

## 2020-10-16 PROCEDURE — 3008F BODY MASS INDEX DOCD: CPT | Performed by: NURSE PRACTITIONER

## 2020-10-16 PROCEDURE — 3079F DIAST BP 80-89 MM HG: CPT | Performed by: NURSE PRACTITIONER

## 2020-10-16 RX ORDER — TRAMADOL HYDROCHLORIDE 50 MG/1
50 TABLET ORAL EVERY 6 HOURS PRN
Qty: 20 TABLET | Refills: 0 | Status: SHIPPED | OUTPATIENT
Start: 2020-10-16 | End: 2021-12-22

## 2020-10-16 NOTE — PROGRESS NOTES
HPI:    Patient ID: Abdias Sheffield is a 78year old male. HPI     Patient is present with complaints of back pain. Is wearing a back brace that is helping. States that he is taking his blood pressure medications in the morning.    No trauma - thinks art Neck: Normal range of motion. Neck supple. No thyromegaly present. Cardiovascular: Normal rate, regular rhythm and normal heart sounds. Pulmonary/Chest: Effort normal and breath sounds normal. No respiratory distress. Abdominal: Soft.  Bowel sounds ar BPH is common in men over age 61. That’s because the prostate grows bigger during a man’s life. As it grows, it presses against the urethra. The urethra is the tube that carries urine out of your body from your bladder through your penis.  Your bladder may Kegel exercises may also help. They strengthen the pelvic muscle to prevent urine from leaking. Contract your pelvic muscles as if you were to stop or slow down the flow of urine. Hold for 10 seconds. Repeat at least 5 times.  Do the exercise 3 to 5 times e

## 2020-10-16 NOTE — PATIENT INSTRUCTIONS
See Dr. Madison Christine for the enlarged prostate. Lab pending. Scheduled for physical with Dr. Paulina Valencia. Follow up sooner if needed. Benign Prostatic Hyperplasia  The prostate is a small gland that in men that makes a fluid that goes into semen.  As you control your BPH with lifestyle changes. Some men feel better if they limit or don't have alcohol and caffeinated drinks such as coffee. Not drinking too many fluids at night can also help. Increasing your physical activity may ease symptoms, too.    [address], [address] [place] [address]. All rights reserved. This information is not intended as a substitute for professional medical care. Always follow your healthcare professional's instructions.

## 2020-10-19 ENCOUNTER — TELEPHONE (OUTPATIENT)
Dept: FAMILY MEDICINE CLINIC | Facility: CLINIC | Age: 70
End: 2020-10-19

## 2020-10-19 DIAGNOSIS — R19.7 DIARRHEA, UNSPECIFIED TYPE: ICD-10-CM

## 2020-10-19 DIAGNOSIS — R39.198 DECREASED URINE STREAM: ICD-10-CM

## 2020-10-19 DIAGNOSIS — M54.50 ACUTE MIDLINE LOW BACK PAIN WITHOUT SCIATICA: Primary | ICD-10-CM

## 2020-10-19 DIAGNOSIS — N40.0 ENLARGED PROSTATE: ICD-10-CM

## 2020-10-19 NOTE — TELEPHONE ENCOUNTER
Patient having lower back pain with a questionable area on the x-ray. Also having slow urine stream and diarrhea. Recommend to have a closer look.

## 2020-10-19 NOTE — TELEPHONE ENCOUNTER
Pt informed. Pt is open to whatever testing is needed. Pt asked if he would come back to office for ultrasound? Pt wanted to know what is indication for pelvic ultrasound.

## 2020-10-19 NOTE — TELEPHONE ENCOUNTER
----- Message from MAT Flynn sent at 10/19/2020  9:25 AM CDT -----  Radiologist did not feel that that prostate is enlarged and the PSA is normal. Recommend a pelvic u/s. X-rays showed lots of arthritic changes. Please let patient know.  Mayra Lot

## 2020-10-20 NOTE — TELEPHONE ENCOUNTER
Since going to the hospital, recommend CT of abdomen and pelvis. Left message for patient to call back to discuss if he is willing to do this testing.

## 2020-10-21 NOTE — TELEPHONE ENCOUNTER
Spoke with patient's sister Mirella Duarte. She states that patient often times does not fully understand what is being told to him. She asked that we contact her to explain these things.      Mirella Duarte states that patient would be agreeable to doing the ultrasound and/or

## 2020-10-21 NOTE — TELEPHONE ENCOUNTER
CT authorized. Contact information for [facility] patient scheduling given to patient's sister Ming Flaherty to schedule an appt.

## 2020-10-27 ENCOUNTER — TELEPHONE (OUTPATIENT)
Dept: FAMILY MEDICINE CLINIC | Facility: CLINIC | Age: 70
End: 2020-10-27

## 2020-10-27 DIAGNOSIS — R93.89 ABNORMAL FINDING ON CT SCAN: Primary | ICD-10-CM

## 2020-10-27 DIAGNOSIS — M54.50 ACUTE MIDLINE LOW BACK PAIN WITHOUT SCIATICA: ICD-10-CM

## 2020-10-27 NOTE — TELEPHONE ENCOUNTER
CT of abdomen and pelvis showed a fluid density collection near the left inguinal canal.  Due to patient's back pain and urinary output decreased, recommend to see a surgeon for further evaluation.   Also there is some degeneration of the spine noted there

## 2020-10-28 NOTE — TELEPHONE ENCOUNTER
Spoke with patient regarding the below. Pt wants a referral for surgeon.  Please advise    Please call patient back with contact information for referral

## 2020-10-28 NOTE — TELEPHONE ENCOUNTER
Future appt:     Your appointments     Date & Time Appointment Department [facility])    Nov 25, 2020  1:30 PM CST Physical - Established with Abe Lemus MD [address], [address]

## 2020-10-29 RX ORDER — LISINOPRIL 20 MG/1
TABLET ORAL
Qty: 60 TABLET | Refills: 0 | Status: SHIPPED | OUTPATIENT
Start: 2020-10-29 | End: 2021-02-10

## 2020-10-29 NOTE — TELEPHONE ENCOUNTER
Spoke with Willis Keyes, he asked to call Bossman Alberto with the information.  Spoke with Bossman Alberto and given information for Dr. Chasidy Olvera

## 2020-11-09 ENCOUNTER — TELEPHONE (OUTPATIENT)
Dept: FAMILY MEDICINE CLINIC | Facility: CLINIC | Age: 70
End: 2020-11-09

## 2020-11-09 NOTE — TELEPHONE ENCOUNTER
Patient needing a Referral for PT at the recommendation of . Appt given with  to discuss PT Referral.    Future appt:     Your appointments     Date & Time Appointment Department [facility])    Nov 11, 2020  2:30 PM CST Exam - [facility]

## 2020-11-11 ENCOUNTER — OFFICE VISIT (OUTPATIENT)
Dept: FAMILY MEDICINE CLINIC | Facility: CLINIC | Age: 70
End: 2020-11-11
Payer: MEDICARE

## 2020-11-11 VITALS
DIASTOLIC BLOOD PRESSURE: 82 MMHG | TEMPERATURE: 98 F | BODY MASS INDEX: 36.55 KG/M2 | RESPIRATION RATE: 16 BRPM | HEART RATE: 104 BPM | WEIGHT: 219.38 LBS | SYSTOLIC BLOOD PRESSURE: 134 MMHG | HEIGHT: 65 IN

## 2020-11-11 DIAGNOSIS — M54.41 CHRONIC MIDLINE LOW BACK PAIN WITH BILATERAL SCIATICA: Primary | ICD-10-CM

## 2020-11-11 DIAGNOSIS — M51.16 LUMBAR DISC DISEASE WITH RADICULOPATHY: ICD-10-CM

## 2020-11-11 DIAGNOSIS — G89.29 CHRONIC MIDLINE LOW BACK PAIN WITH BILATERAL SCIATICA: Primary | ICD-10-CM

## 2020-11-11 DIAGNOSIS — M54.42 CHRONIC MIDLINE LOW BACK PAIN WITH BILATERAL SCIATICA: Primary | ICD-10-CM

## 2020-11-11 PROCEDURE — 3008F BODY MASS INDEX DOCD: CPT | Performed by: FAMILY MEDICINE

## 2020-11-11 PROCEDURE — 3075F SYST BP GE 130 - 139MM HG: CPT | Performed by: FAMILY MEDICINE

## 2020-11-11 PROCEDURE — 99213 OFFICE O/P EST LOW 20 MIN: CPT | Performed by: FAMILY MEDICINE

## 2020-11-11 PROCEDURE — 3079F DIAST BP 80-89 MM HG: CPT | Performed by: FAMILY MEDICINE

## 2020-11-11 RX ORDER — TAMSULOSIN HYDROCHLORIDE 0.4 MG/1
0.4 CAPSULE ORAL DAILY
COMMUNITY
Start: 2020-10-26 | End: 2021-10-26

## 2020-11-11 NOTE — PROGRESS NOTES
[facility] [place]  PROGRESS NOTE  Chief Complaint:   Patient presents with:  Referral: Physical Therapy/ Note 's OV      HPI:   This is a 78year old male coming in for evaluation for back pain.     He said that he has had back pain f Derived Produc*    SWELLING    Comment:Fat from Pork? Current Meds:  Current Outpatient Medications   Medication Sig Dispense Refill   • tamsulosin (FLOMAX) cap Take 0.4 mg by mouth daily.      • LISINOPRIL 20 MG Oral Tab TAKE ONE TABLET BY MOUTH TWICE KIERA muscles bilaterally. No deformity noted. No redness or swelling noted. Deep tendon reflexes 2+ and equal bilaterally. ASSESSMENT AND PLAN:   1.  Chronic midline low back pain with bilateral sciatica  He does have chronic midline low back pain with sci

## 2020-11-12 ENCOUNTER — TELEPHONE (OUTPATIENT)
Dept: FAMILY MEDICINE CLINIC | Facility: CLINIC | Age: 70
End: 2020-11-12

## 2020-11-12 NOTE — TELEPHONE ENCOUNTER
Pts PT orders were sent do [facility] but they ask we send the orders to [facility] in [place]. LW:[phone]  F: [phone]    Please call Maribel back when complete so she can schedule for Eduar.

## 2020-11-19 ENCOUNTER — TELEPHONE (OUTPATIENT)
Dept: FAMILY MEDICINE CLINIC | Facility: CLINIC | Age: 70
End: 2020-11-19

## 2020-11-19 NOTE — TELEPHONE ENCOUNTER
Spoke with wife. Patient not able to work with back pain. Hopefully it will improve with therapy. His employer is requesting a doctor's note. Wife states okay to wait for Dr. Niels Hurt return tomorrow.

## 2020-11-19 NOTE — TELEPHONE ENCOUNTER
11/11/20 office visit with Dr. Nelson Stockton for Chronic midline low back pain with bilat sciatica and lumbar disc disease with radiculopathy. PT advised.

## 2020-11-25 ENCOUNTER — OFFICE VISIT (OUTPATIENT)
Dept: FAMILY MEDICINE CLINIC | Facility: CLINIC | Age: 70
End: 2020-11-25
Payer: MEDICARE

## 2020-11-25 VITALS
WEIGHT: 221 LBS | DIASTOLIC BLOOD PRESSURE: 80 MMHG | RESPIRATION RATE: 20 BRPM | BODY MASS INDEX: 36.82 KG/M2 | HEIGHT: 65 IN | OXYGEN SATURATION: 96 % | SYSTOLIC BLOOD PRESSURE: 136 MMHG | HEART RATE: 81 BPM | TEMPERATURE: 98 F

## 2020-11-25 DIAGNOSIS — R35.1 BPH ASSOCIATED WITH NOCTURIA: ICD-10-CM

## 2020-11-25 DIAGNOSIS — Z00.00 ENCOUNTER FOR ANNUAL HEALTH EXAMINATION: Primary | ICD-10-CM

## 2020-11-25 DIAGNOSIS — R73.09 OTHER ABNORMAL GLUCOSE: ICD-10-CM

## 2020-11-25 DIAGNOSIS — E78.49 FAMILIAL MULTIPLE LIPOPROTEIN-TYPE HYPERLIPIDEMIA: ICD-10-CM

## 2020-11-25 DIAGNOSIS — E66.09 CLASS 2 OBESITY DUE TO EXCESS CALORIES WITHOUT SERIOUS COMORBIDITY WITH BODY MASS INDEX (BMI) OF 36.0 TO 36.9 IN ADULT: ICD-10-CM

## 2020-11-25 DIAGNOSIS — M15.9 PRIMARY OSTEOARTHRITIS INVOLVING MULTIPLE JOINTS: ICD-10-CM

## 2020-11-25 DIAGNOSIS — F79 INTELLECTUAL DISABILITY: ICD-10-CM

## 2020-11-25 DIAGNOSIS — I10 BENIGN ESSENTIAL HYPERTENSION: ICD-10-CM

## 2020-11-25 DIAGNOSIS — M54.41 CHRONIC MIDLINE LOW BACK PAIN WITH BILATERAL SCIATICA: ICD-10-CM

## 2020-11-25 DIAGNOSIS — M54.42 CHRONIC MIDLINE LOW BACK PAIN WITH BILATERAL SCIATICA: ICD-10-CM

## 2020-11-25 DIAGNOSIS — G89.29 CHRONIC MIDLINE LOW BACK PAIN WITH BILATERAL SCIATICA: ICD-10-CM

## 2020-11-25 DIAGNOSIS — N40.1 BPH ASSOCIATED WITH NOCTURIA: ICD-10-CM

## 2020-11-25 DIAGNOSIS — M51.16 LUMBAR DISC DISEASE WITH RADICULOPATHY: ICD-10-CM

## 2020-11-25 PROBLEM — N47.1 PHIMOSIS: Status: RESOLVED | Noted: 2017-04-28 | Resolved: 2020-11-25

## 2020-11-25 PROBLEM — D64.9 ANEMIA: Status: RESOLVED | Noted: 2017-04-28 | Resolved: 2020-11-25

## 2020-11-25 PROCEDURE — 99213 OFFICE O/P EST LOW 20 MIN: CPT | Performed by: FAMILY MEDICINE

## 2020-11-25 PROCEDURE — G0439 PPPS, SUBSEQ VISIT: HCPCS | Performed by: FAMILY MEDICINE

## 2020-11-25 NOTE — PROGRESS NOTES
REASON FOR VISIT:    Loida Best is a 78year old male who presents for a Medicare Annual Wellness visit. He reports that he continues to work for [employer] in [employer]. His back bothers him all the time.   He has pain that goes down both leg 40 - 59 mg/dL 34(L) 32(L)   LDL <100 mg/dL 91 84     BUN and Cr Latest Ref Rng & Units 10/23/2020 6/25/2019 6/6/2018 4/28/2017   BUN 7 - 18 mg/dL - 22(H) 17 17   Creatinine mg/dL 0.88 1.05 1.06 1.07     AST and ALT Latest Ref Rng & Units 10/23/2020 6/25/20 conditions?: 1-Yes  Do you accidently lose urine?: 0-No  Do you have difficulty seeing?: 0-No  Do you have any difficulty walking or getting up?: 1-Yes  Do you have any tripping hazards?: 0-No  Are you on multiple medications?: 1-Yes  Does pain affect your Potassium  Annually POTASSIUM (no units)   Date Value   10/23/2020 3.7       Creatinine  Annually Creatinine (mg/dL)   Date Value   06/25/2019 1.05       Digoxin Serum Conc  Annually No results found for: DIGOXIN          ALLERGIES:     Peanut Oil anxiety  HEMATOLOGIC: denies hx of anemia  ENDOCRINE: denies thyroid history  ALL/ASTHMA: denies hx of allergy or asthma    EXAM:   /80   Pulse 81   Temp 98 °F (36.7 °C)   Resp 20   Ht 65\"   Wt 221 lb (100.2 kg)   SpO2 96%   BMI 36.78 kg/m²    > BP disability  Unchanged. - OFFICE/OUTPT VISIT,REHAN ROBERSON III    6. Chronic midline low back pain with bilateral sciatica  He does have chronic mid low back pain with sciatica. He will do physical therapy for 8 to 12 weeks.   If this does not improve at that t

## 2020-12-10 ENCOUNTER — TELEPHONE (OUTPATIENT)
Dept: FAMILY MEDICINE CLINIC | Facility: CLINIC | Age: 70
End: 2020-12-10

## 2020-12-10 NOTE — TELEPHONE ENCOUNTER
PT needs POC formed signed if dr agrees. Evaluation for physcial therapy was faxed 12/8.  Fax [phone]

## 2020-12-11 NOTE — TELEPHONE ENCOUNTER
Phoned [facility] PT/Nick. Informed fax for patient did not come to . They will resend.   Mulu Every, 12/11/20, 10:25 AM

## 2021-01-19 ENCOUNTER — TELEPHONE (OUTPATIENT)
Dept: FAMILY MEDICINE CLINIC | Facility: CLINIC | Age: 71
End: 2021-01-19

## 2021-01-19 DIAGNOSIS — G89.29 CHRONIC MIDLINE LOW BACK PAIN WITH BILATERAL SCIATICA: ICD-10-CM

## 2021-01-19 DIAGNOSIS — M54.41 CHRONIC MIDLINE LOW BACK PAIN WITH BILATERAL SCIATICA: ICD-10-CM

## 2021-01-19 DIAGNOSIS — M51.16 LUMBAR DISC DISEASE WITH RADICULOPATHY: Primary | ICD-10-CM

## 2021-01-19 DIAGNOSIS — M54.42 CHRONIC MIDLINE LOW BACK PAIN WITH BILATERAL SCIATICA: ICD-10-CM

## 2021-01-19 NOTE — TELEPHONE ENCOUNTER
Dr. Amita Lambert is out of the office today. Per his note 11/25/20, refer to spine center. Referral done. Please let patient know. Thank you.

## 2021-01-19 NOTE — TELEPHONE ENCOUNTER
Pt was given an order for PT due to low back pain with sciatica. He has completed the therapy. Ramesh Hurt states that PT said that he reached a plateau and there is nothing more that they can due for him.      Ramesh Hurt states that he still has some back pain but

## 2021-02-10 RX ORDER — AMLODIPINE BESYLATE 5 MG/1
TABLET ORAL
Qty: 90 TABLET | Refills: 1 | Status: SHIPPED | OUTPATIENT
Start: 2021-02-10 | End: 2021-09-01

## 2021-02-10 RX ORDER — LISINOPRIL 20 MG/1
TABLET ORAL
Qty: 180 TABLET | Refills: 1 | Status: SHIPPED | OUTPATIENT
Start: 2021-02-10 | End: 2021-09-01

## 2021-02-10 NOTE — TELEPHONE ENCOUNTER
Future appt:     Last Appointment with provider:   11/25/2020; Return in 1 year (on 11/25/2021).     Last appointment at [facility] [place]:  11/25/2020  Cholesterol, Total (mg/dL)   Date Value   06/25/2019 181     HDL Cholesterol (mg/dL)   Date Value   06/25/201

## 2021-02-10 NOTE — TELEPHONE ENCOUNTER
Future appt:     Last Appointment with provider:  11/25/2020; Return in 1 year (on 11/25/2021).     Last appointment at [facility] [place]:  11/25/2020  Cholesterol, Total (mg/dL)   Date Value   06/25/2019 181     HDL Cholesterol (mg/dL)   Date Value   06/25/2019

## 2021-03-09 DIAGNOSIS — Z23 NEED FOR VACCINATION: ICD-10-CM

## 2021-03-23 ENCOUNTER — TELEPHONE (OUTPATIENT)
Dept: FAMILY MEDICINE CLINIC | Facility: CLINIC | Age: 71
End: 2021-03-23

## 2021-03-23 NOTE — TELEPHONE ENCOUNTER
Would like Dr/ nurse to please fax copy of last visit (medicare wellness exam) to [phone]: Brody Robert.

## 2021-03-23 NOTE — TELEPHONE ENCOUNTER
Per Maribel/Guardian-  Requesting patient's Medicare Exam to be faxed to  Contreras Pena at [facility]. Patient looking to get into a program at   [facility] and they are needing this   Medical Record. Will discuss with Marixa Rivers.

## 2021-03-24 NOTE — TELEPHONE ENCOUNTER
Confirmed with Tameka Hobbs to fax the Medicare Exam to [employer] at above fax number.   Toshiakathy Yu, 03/24/21, 10:13 AM

## 2021-09-01 RX ORDER — LISINOPRIL 20 MG/1
TABLET ORAL
Qty: 180 TABLET | Refills: 0 | Status: SHIPPED | OUTPATIENT
Start: 2021-09-01 | End: 2021-12-06

## 2021-09-01 RX ORDER — AMLODIPINE BESYLATE 5 MG/1
TABLET ORAL
Qty: 90 TABLET | Refills: 0 | Status: SHIPPED | OUTPATIENT
Start: 2021-09-01 | End: 2021-12-06

## 2021-09-01 NOTE — TELEPHONE ENCOUNTER
Amlodipine/Lisinopril: 2/10/21     Return in 1 year (on 11/25/2021).   Future appt:    Last Appointment with provider:     11/25/20    Last appointment at [facility] [place]:  Visit date not found  Cholesterol, Total (mg/dL)   Date Value   06/25/2019 181     HDL

## 2021-09-08 ENCOUNTER — OFFICE VISIT (OUTPATIENT)
Dept: FAMILY MEDICINE CLINIC | Facility: CLINIC | Age: 71
End: 2021-09-08
Payer: MEDICARE

## 2021-09-08 VITALS
HEART RATE: 97 BPM | OXYGEN SATURATION: 96 % | TEMPERATURE: 98 F | SYSTOLIC BLOOD PRESSURE: 112 MMHG | DIASTOLIC BLOOD PRESSURE: 80 MMHG

## 2021-09-08 DIAGNOSIS — H69.83 EUSTACHIAN TUBE DYSFUNCTION, BILATERAL: Primary | ICD-10-CM

## 2021-09-08 DIAGNOSIS — H65.01 RIGHT ACUTE SEROUS OTITIS MEDIA, RECURRENCE NOT SPECIFIED: ICD-10-CM

## 2021-09-08 PROCEDURE — 99213 OFFICE O/P EST LOW 20 MIN: CPT | Performed by: NURSE PRACTITIONER

## 2021-09-08 RX ORDER — CEFDINIR 300 MG/1
300 CAPSULE ORAL 2 TIMES DAILY
Qty: 20 CAPSULE | Refills: 0 | Status: SHIPPED | OUTPATIENT
Start: 2021-09-08 | End: 2021-09-18

## 2021-09-08 RX ORDER — FLUTICASONE PROPIONATE 50 MCG
2 SPRAY, SUSPENSION (ML) NASAL DAILY
Qty: 18.2 ML | Refills: 0 | Status: SHIPPED | OUTPATIENT
Start: 2021-09-08 | End: 2021-12-06

## 2021-09-08 NOTE — PROGRESS NOTES
HPI:    Patient ID: Yefri Noel is a 78year old male. [facility]     Respiratory Clinic    Was seen 8/29 for right ear infection. No nasal congestion. No cough or ST. States that his ears are still plugged. Also has ringing in his right for years.  Sta external ear normal. Tympanic membrane is injected. Left Ear: Hearing, tympanic membrane, ear canal and external ear normal.      Nose: Mucosal edema present. Right Sinus: No maxillary sinus tenderness or frontal sinus tenderness.       Left Sinus Flonase nasal spray - 2 sprays to each nostril daily as needed. Follow up if not better or if worsens.             RI#[id number]

## 2021-09-08 NOTE — PATIENT INSTRUCTIONS
Take Cefdinir twice a day for 10 days     Start Flonase nasal spray - 2 sprays to each nostril daily as needed. Follow up if not better or if worsens.

## 2021-12-06 RX ORDER — AMLODIPINE BESYLATE 5 MG/1
5 TABLET ORAL DAILY
Qty: 90 TABLET | Refills: 0 | Status: SHIPPED | OUTPATIENT
Start: 2021-12-06 | End: 2021-12-22

## 2021-12-06 RX ORDER — FLUTICASONE PROPIONATE 50 MCG
SPRAY, SUSPENSION (ML) NASAL
Qty: 16 G | Refills: 3 | Status: SHIPPED | OUTPATIENT
Start: 2021-12-06 | End: 2021-12-22

## 2021-12-06 RX ORDER — LISINOPRIL 20 MG/1
20 TABLET ORAL 2 TIMES DAILY
Qty: 180 TABLET | Refills: 0 | Status: SHIPPED | OUTPATIENT
Start: 2021-12-06 | End: 2021-12-22

## 2021-12-06 NOTE — TELEPHONE ENCOUNTER
Patient informed over 1 year since seen.   He will have his sister call to make an appt for   Medicare Physical.  Sondra Rendon CMA, 12/06/21, 3:24 PM      Amlodipine: 9/1/21    Future appt:    Last Appointment with provider:   11/2020  Last appointment at E

## 2021-12-06 NOTE — TELEPHONE ENCOUNTER
Future appt:    Last Appointment with provider:   9/8/2021 resp clinic.   Last appointment at [facility]:  9/8/2021  Cholesterol, Total (mg/dL)   Date Value   06/25/2019 181     HDL Cholesterol (mg/dL)   Date Value   06/25/2019 34 (L)     LDL Cholesterol

## 2021-12-22 ENCOUNTER — OFFICE VISIT (OUTPATIENT)
Dept: FAMILY MEDICINE CLINIC | Facility: CLINIC | Age: 71
End: 2021-12-22
Payer: MEDICARE

## 2021-12-22 ENCOUNTER — LAB ENCOUNTER (OUTPATIENT)
Dept: LAB | Age: 71
End: 2021-12-22
Attending: NURSE PRACTITIONER
Payer: MEDICARE

## 2021-12-22 VITALS
HEART RATE: 84 BPM | WEIGHT: 220.63 LBS | HEIGHT: 65 IN | OXYGEN SATURATION: 97 % | BODY MASS INDEX: 36.76 KG/M2 | SYSTOLIC BLOOD PRESSURE: 138 MMHG | DIASTOLIC BLOOD PRESSURE: 88 MMHG | TEMPERATURE: 98 F | RESPIRATION RATE: 16 BRPM

## 2021-12-22 DIAGNOSIS — N40.1 BPH ASSOCIATED WITH NOCTURIA: ICD-10-CM

## 2021-12-22 DIAGNOSIS — M51.16 LUMBAR DISC DISEASE WITH RADICULOPATHY: ICD-10-CM

## 2021-12-22 DIAGNOSIS — H66.91 RIGHT OTITIS MEDIA, UNSPECIFIED OTITIS MEDIA TYPE: ICD-10-CM

## 2021-12-22 DIAGNOSIS — M54.42 CHRONIC MIDLINE LOW BACK PAIN WITH BILATERAL SCIATICA: ICD-10-CM

## 2021-12-22 DIAGNOSIS — I10 BENIGN ESSENTIAL HYPERTENSION: ICD-10-CM

## 2021-12-22 DIAGNOSIS — M15.9 PRIMARY OSTEOARTHRITIS INVOLVING MULTIPLE JOINTS: ICD-10-CM

## 2021-12-22 DIAGNOSIS — R35.1 BPH ASSOCIATED WITH NOCTURIA: ICD-10-CM

## 2021-12-22 DIAGNOSIS — G89.29 CHRONIC MIDLINE LOW BACK PAIN WITH BILATERAL SCIATICA: ICD-10-CM

## 2021-12-22 DIAGNOSIS — M54.41 CHRONIC MIDLINE LOW BACK PAIN WITH BILATERAL SCIATICA: ICD-10-CM

## 2021-12-22 DIAGNOSIS — R73.09 OTHER ABNORMAL GLUCOSE: ICD-10-CM

## 2021-12-22 DIAGNOSIS — Z12.5 SCREENING FOR MALIGNANT NEOPLASM OF PROSTATE: ICD-10-CM

## 2021-12-22 DIAGNOSIS — E66.09 CLASS 2 OBESITY DUE TO EXCESS CALORIES WITHOUT SERIOUS COMORBIDITY WITH BODY MASS INDEX (BMI) OF 36.0 TO 36.9 IN ADULT: ICD-10-CM

## 2021-12-22 DIAGNOSIS — F79 INTELLECTUAL DISABILITY: ICD-10-CM

## 2021-12-22 DIAGNOSIS — E78.49 FAMILIAL MULTIPLE LIPOPROTEIN-TYPE HYPERLIPIDEMIA: ICD-10-CM

## 2021-12-22 DIAGNOSIS — Z12.11 SCREENING FOR MALIGNANT NEOPLASM OF COLON: ICD-10-CM

## 2021-12-22 DIAGNOSIS — Z00.00 ENCOUNTER FOR MEDICARE ANNUAL WELLNESS EXAM: Primary | ICD-10-CM

## 2021-12-22 PROCEDURE — 84439 ASSAY OF FREE THYROXINE: CPT | Performed by: NURSE PRACTITIONER

## 2021-12-22 PROCEDURE — G0439 PPPS, SUBSEQ VISIT: HCPCS | Performed by: NURSE PRACTITIONER

## 2021-12-22 PROCEDURE — 80061 LIPID PANEL: CPT | Performed by: NURSE PRACTITIONER

## 2021-12-22 PROCEDURE — 85025 COMPLETE CBC W/AUTO DIFF WBC: CPT | Performed by: NURSE PRACTITIONER

## 2021-12-22 PROCEDURE — 99214 OFFICE O/P EST MOD 30 MIN: CPT | Performed by: NURSE PRACTITIONER

## 2021-12-22 PROCEDURE — 80053 COMPREHEN METABOLIC PANEL: CPT | Performed by: NURSE PRACTITIONER

## 2021-12-22 PROCEDURE — 36415 COLL VENOUS BLD VENIPUNCTURE: CPT | Performed by: NURSE PRACTITIONER

## 2021-12-22 PROCEDURE — 84443 ASSAY THYROID STIM HORMONE: CPT | Performed by: NURSE PRACTITIONER

## 2021-12-22 RX ORDER — FLUTICASONE PROPIONATE 50 MCG
2 SPRAY, SUSPENSION (ML) NASAL DAILY
Qty: 16 G | Refills: 3 | Status: SHIPPED | OUTPATIENT
Start: 2021-12-22

## 2021-12-22 RX ORDER — AMLODIPINE BESYLATE 5 MG/1
5 TABLET ORAL DAILY
Qty: 90 TABLET | Refills: 1 | Status: SHIPPED | OUTPATIENT
Start: 2021-12-22

## 2021-12-22 RX ORDER — LISINOPRIL 20 MG/1
20 TABLET ORAL 2 TIMES DAILY
Qty: 180 TABLET | Refills: 1 | Status: SHIPPED | OUTPATIENT
Start: 2021-12-22

## 2021-12-22 RX ORDER — CEFDINIR 300 MG/1
300 CAPSULE ORAL 2 TIMES DAILY
Qty: 20 CAPSULE | Refills: 0 | Status: SHIPPED | OUTPATIENT
Start: 2021-12-22 | End: 2022-01-01

## 2021-12-22 NOTE — PROGRESS NOTES
HPI:   Dmitriy Stone is a 73year old male who presents for a Medicare Subsequent Annual Wellness visit (Pt already had Initial Annual Wellness).     His last annual assessment has been over 1 year: Annual Physical due on 12/22/2022     Medicare [employer] Advanced Directive:  He does NOT have a Living Will on file in [address] [place] [address] Rd.    Advance care planning including the explanation and discussion of advance directives standard forms performed Face to Face with patient and Family/surrogate (if present), and forms a daily.  amLODIPine 5 MG Oral Tab, Take 1 tablet (5 mg total) by mouth daily. fluticasone propionate 50 MCG/ACT Nasal Suspension, 2 sprays by Nasal route daily.   cefdinir 300 MG Oral Cap, Take 1 capsule (300 mg total) by mouth 2 (two) times daily for 10 da trachea midline, no adenopathy, thyroid: not enlarged, symmetric, no tenderness/mass/nodules, no carotid bruit or JVD   Back:   Symmetric, no curvature, ROM normal, no CVA tenderness   Lungs:   Clear to auscultation bilaterally, respirations unlabored   Ch completed. Plan for 6-month follow-up. Familial multiple lipoprotein-type hyperlipidemia  Stable, labs today though are not fasting. We will check today.     Benign essential hypertension  Stable, patient has been out of his medications for the last 2 current health state?: Good  How do you maintain positive mental well-being?: Social Interaction; Visiting Friends     Supplementary Documentation:   Benito Cervantes's SCREENING SCHEDULE   Tests on this list are recommended by your physician but may not b 10/16/2022   Immunizations    Influenza Covered once per flu season  Please get every year 09/29/2021  No recommendations at this time    Pneumococcal Each vaccine (Vsarkql52 & [id number]) covered once after 65 Prevnar 13: 06/06/2018    [id number]: 06/25

## 2021-12-22 NOTE — PATIENT INSTRUCTIONS
Labs today. Get your shingles vaccine through the pharmacy. Stool card for colon cancer screening. Take medication cefdinir 300mg twice a day for ten days for your right ear infection; take medication with food.         [id number] [facility] Annually Lab Results   Component Value Date    PSA 0.29 10/16/2020     PSA due on 10/16/2022   Immunizations    Influenza Covered once per flu season  Please get every year 09/29/2021  No recommendations at this time    Pneumococcal Each vaccine Griselda Lyon also has information from the [address] [place] [address] regarding Advance Directives.

## 2021-12-27 ENCOUNTER — TELEPHONE (OUTPATIENT)
Dept: FAMILY MEDICINE CLINIC | Facility: CLINIC | Age: 71
End: 2021-12-27

## 2021-12-27 NOTE — PROGRESS NOTES
Please call Diana Noel. Patient's labs stable compared to prior. Cholesterol mildly elevated though this was not a fasting reading. No sign of diabetes. Prostate level normal, normal thyroid. Stable CBC. Overall these are good readings.   Plan to follow

## 2021-12-27 NOTE — TELEPHONE ENCOUNTER
----- Message from MAT Murphy sent at 12/27/2021 11:41 AM CST -----  Please call Monty Jon. Patient's labs stable compared to prior. Cholesterol mildly elevated though this was not a fasting reading. No sign of diabetes.    Prostate level normal,

## 2021-12-28 ENCOUNTER — TELEPHONE (OUTPATIENT)
Dept: FAMILY MEDICINE CLINIC | Facility: CLINIC | Age: 71
End: 2021-12-28

## 2021-12-28 DIAGNOSIS — H93.13 TINNITUS OF BOTH EARS: ICD-10-CM

## 2021-12-28 DIAGNOSIS — H69.83 EUSTACHIAN TUBE DYSFUNCTION, BILATERAL: ICD-10-CM

## 2021-12-28 DIAGNOSIS — H66.91 RIGHT OTITIS MEDIA, UNSPECIFIED OTITIS MEDIA TYPE: Primary | ICD-10-CM

## 2021-12-30 NOTE — TELEPHONE ENCOUNTER
Sister Leigh Banegass informed of below. She will call 's office on Monday to schedule an appointment.   Arlin Aponte CMA, 12/29/21, 6:51 PM

## 2021-12-30 NOTE — TELEPHONE ENCOUNTER
Sister [facility] patient has been taking the antibiotic for his ear infection. [facility] [place] [facility] patient still c/o ear ringing. [facility] [place] [facility] patient has had ongoing ear ringing for along time. This is not new.     Patient thought he was going to be getting a medication fo

## 2021-12-30 NOTE — TELEPHONE ENCOUNTER
His ear was discussed though he indicated his right ear was hurting him recently. He had redness along the tympanic membrane of his ear and I prescribed the antibiotic for an ear infection with those findings.     If ear RINGING is an ongoing issue then

## 2021-12-30 NOTE — TELEPHONE ENCOUNTER
----- Message from Jocelyn Humphries sent at 12/29/2021 10:06 AM CST -----  Returned [facility] [phone]

## 2022-02-24 ENCOUNTER — TELEPHONE (OUTPATIENT)
Dept: FAMILY MEDICINE CLINIC | Facility: CLINIC | Age: 72
End: 2022-02-24

## 2022-02-24 NOTE — TELEPHONE ENCOUNTER
Recommend to take a dose of amlodipine 5 mg right now. Recommend to schedule appointment with available provider today or tomorrow for further evaluation. Go to emergency room if having any chest pain, shortness of breath, dizziness or blood pressure does not improve.

## 2022-02-24 NOTE — TELEPHONE ENCOUNTER
Spoke to [facility] at [place] stated Pt BP was high this morning at an ENT appt. 190/110- pt had just walked into room. 160/88 after waiting for about 5 minutes. Pt denies any pain, lightheaded, discomfort  Pt did vomit this morning before went to appt, felt due to ear issue that he was going to appt for. [facility] also mentioned that pt not sure if pt is taking medication correctly. Pt is independent with taking medications.     /118 currently with HR 94

## 2022-02-24 NOTE — TELEPHONE ENCOUNTER
Spoke to [place] verbalized of recommendations  appt made    Future Appointments   Date Time Provider Ila Wilson   2/25/2022  9:30 AM MAT Sanders [facility]     No further questions

## 2022-02-25 ENCOUNTER — OFFICE VISIT (OUTPATIENT)
Dept: FAMILY MEDICINE CLINIC | Facility: CLINIC | Age: 72
End: 2022-02-25
Payer: MEDICARE

## 2022-02-25 VITALS
RESPIRATION RATE: 16 BRPM | BODY MASS INDEX: 36.49 KG/M2 | SYSTOLIC BLOOD PRESSURE: 158 MMHG | WEIGHT: 219 LBS | DIASTOLIC BLOOD PRESSURE: 102 MMHG | TEMPERATURE: 98 F | OXYGEN SATURATION: 98 % | HEART RATE: 82 BPM | HEIGHT: 65 IN

## 2022-02-25 DIAGNOSIS — I10 BENIGN ESSENTIAL HYPERTENSION: ICD-10-CM

## 2022-02-25 PROCEDURE — 99214 OFFICE O/P EST MOD 30 MIN: CPT | Performed by: NURSE PRACTITIONER

## 2022-02-25 RX ORDER — AMLODIPINE BESYLATE 5 MG/1
5 TABLET ORAL DAILY
Qty: 90 TABLET | Refills: 1 | Status: SHIPPED | OUTPATIENT
Start: 2022-02-25

## 2022-02-25 RX ORDER — FLUTICASONE PROPIONATE 50 MCG
2 SPRAY, SUSPENSION (ML) NASAL DAILY
Qty: 3 EACH | Refills: 3 | Status: SHIPPED | OUTPATIENT
Start: 2022-02-25

## 2022-02-25 RX ORDER — LISINOPRIL 20 MG/1
20 TABLET ORAL 2 TIMES DAILY
Qty: 180 TABLET | Refills: 1 | Status: SHIPPED | OUTPATIENT
Start: 2022-02-25

## 2022-02-25 RX ORDER — HYDROCHLOROTHIAZIDE 12.5 MG/1
12.5 TABLET ORAL DAILY
Qty: 90 TABLET | Refills: 1 | Status: SHIPPED | OUTPATIENT
Start: 2022-02-25 | End: 2022-03-14 | Stop reason: DRUGHIGH

## 2022-03-04 ENCOUNTER — LABORATORY ENCOUNTER (OUTPATIENT)
Dept: LAB | Age: 72
End: 2022-03-04
Attending: FAMILY MEDICINE
Payer: MEDICARE

## 2022-03-04 DIAGNOSIS — I10 BENIGN ESSENTIAL HYPERTENSION: ICD-10-CM

## 2022-03-04 LAB
ALBUMIN SERPL-MCNC: 4.1 G/DL (ref 3.4–5)
ALBUMIN/GLOB SERPL: 1.3 {RATIO} (ref 1–2)
ALP LIVER SERPL-CCNC: 98 U/L
ALT SERPL-CCNC: 16 U/L
ANION GAP SERPL CALC-SCNC: 7 MMOL/L (ref 0–18)
AST SERPL-CCNC: 28 U/L (ref 15–37)
BILIRUB SERPL-MCNC: 0.6 MG/DL (ref 0.1–2)
BUN BLD-MCNC: 22 MG/DL (ref 7–18)
CALCIUM BLD-MCNC: 8.9 MG/DL (ref 8.5–10.1)
CHLORIDE SERPL-SCNC: 101 MMOL/L (ref 98–112)
CO2 SERPL-SCNC: 30 MMOL/L (ref 21–32)
CREAT BLD-MCNC: 0.94 MG/DL
FASTING STATUS PATIENT QL REPORTED: NO
GLOBULIN PLAS-MCNC: 3.1 G/DL (ref 2.8–4.4)
GLUCOSE BLD-MCNC: 102 MG/DL (ref 70–99)
OSMOLALITY SERPL CALC.SUM OF ELEC: 290 MOSM/KG (ref 275–295)
POTASSIUM SERPL-SCNC: 3.4 MMOL/L (ref 3.5–5.1)
PROT SERPL-MCNC: 7.2 G/DL (ref 6.4–8.2)
SODIUM SERPL-SCNC: 138 MMOL/L (ref 136–145)

## 2022-03-04 PROCEDURE — 80053 COMPREHEN METABOLIC PANEL: CPT

## 2022-03-04 PROCEDURE — 36415 COLL VENOUS BLD VENIPUNCTURE: CPT

## 2022-03-07 ENCOUNTER — TELEPHONE (OUTPATIENT)
Dept: FAMILY MEDICINE CLINIC | Facility: CLINIC | Age: 72
End: 2022-03-07

## 2022-03-07 RX ORDER — POTASSIUM CHLORIDE 750 MG/1
10 TABLET, FILM COATED, EXTENDED RELEASE ORAL DAILY
Qty: 30 TABLET | Refills: 3 | Status: SHIPPED | OUTPATIENT
Start: 2022-03-07 | End: 2022-03-28

## 2022-03-07 NOTE — PROGRESS NOTES
Potassium is slightly low- recommend potassium chloride 10 mEq daily -  Follow up for blood work in 1-2 weeks

## 2022-03-07 NOTE — TELEPHONE ENCOUNTER
----- Message from MAT Rashid sent at 3/7/2022  8:06 AM CST -----  Please notify care provider that patient's potassium is a little low- Prescription for potassium chloride - 10 mEq - daily- follow up for blood work in 1-2 weeks

## 2022-03-07 NOTE — TELEPHONE ENCOUNTER
Spoke to caregiver (see next phone note if needed) and she understands, will it be okay to have the f/u labs done 3/14 after his visit?     Please advise

## 2022-03-14 ENCOUNTER — LAB ENCOUNTER (OUTPATIENT)
Dept: LAB | Age: 72
End: 2022-03-14
Attending: NURSE PRACTITIONER
Payer: MEDICARE

## 2022-03-14 ENCOUNTER — OFFICE VISIT (OUTPATIENT)
Dept: FAMILY MEDICINE CLINIC | Facility: CLINIC | Age: 72
End: 2022-03-14
Payer: MEDICARE

## 2022-03-14 VITALS
TEMPERATURE: 98 F | WEIGHT: 215 LBS | DIASTOLIC BLOOD PRESSURE: 92 MMHG | OXYGEN SATURATION: 94 % | BODY MASS INDEX: 35.82 KG/M2 | RESPIRATION RATE: 18 BRPM | HEIGHT: 65 IN | HEART RATE: 88 BPM | SYSTOLIC BLOOD PRESSURE: 142 MMHG

## 2022-03-14 DIAGNOSIS — E87.6 LOW POTASSIUM SYNDROME: ICD-10-CM

## 2022-03-14 DIAGNOSIS — N40.1 BPH ASSOCIATED WITH NOCTURIA: ICD-10-CM

## 2022-03-14 DIAGNOSIS — I10 BENIGN ESSENTIAL HYPERTENSION: Primary | ICD-10-CM

## 2022-03-14 DIAGNOSIS — R35.1 BPH ASSOCIATED WITH NOCTURIA: ICD-10-CM

## 2022-03-14 LAB
ALBUMIN SERPL-MCNC: 3.9 G/DL (ref 3.4–5)
ALBUMIN/GLOB SERPL: 1.2 {RATIO} (ref 1–2)
ALP LIVER SERPL-CCNC: 93 U/L
ALT SERPL-CCNC: 16 U/L
ANION GAP SERPL CALC-SCNC: 8 MMOL/L (ref 0–18)
AST SERPL-CCNC: 22 U/L (ref 15–37)
BILIRUB SERPL-MCNC: 0.5 MG/DL (ref 0.1–2)
BUN BLD-MCNC: 24 MG/DL (ref 7–18)
CALCIUM BLD-MCNC: 9.6 MG/DL (ref 8.5–10.1)
CHLORIDE SERPL-SCNC: 105 MMOL/L (ref 98–112)
CO2 SERPL-SCNC: 25 MMOL/L (ref 21–32)
CREAT BLD-MCNC: 1.14 MG/DL
FASTING STATUS PATIENT QL REPORTED: NO
GLOBULIN PLAS-MCNC: 3.2 G/DL (ref 2.8–4.4)
GLUCOSE BLD-MCNC: 102 MG/DL (ref 70–99)
OSMOLALITY SERPL CALC.SUM OF ELEC: 290 MOSM/KG (ref 275–295)
POTASSIUM SERPL-SCNC: 3.6 MMOL/L (ref 3.5–5.1)
PROT SERPL-MCNC: 7.1 G/DL (ref 6.4–8.2)
SODIUM SERPL-SCNC: 138 MMOL/L (ref 136–145)

## 2022-03-14 PROCEDURE — 80053 COMPREHEN METABOLIC PANEL: CPT | Performed by: NURSE PRACTITIONER

## 2022-03-14 PROCEDURE — 99214 OFFICE O/P EST MOD 30 MIN: CPT | Performed by: NURSE PRACTITIONER

## 2022-03-14 PROCEDURE — 36415 COLL VENOUS BLD VENIPUNCTURE: CPT | Performed by: NURSE PRACTITIONER

## 2022-03-14 RX ORDER — HYDROCHLOROTHIAZIDE 25 MG/1
25 TABLET ORAL DAILY
Qty: 30 TABLET | Refills: 2 | Status: SHIPPED | OUTPATIENT
Start: 2022-03-14

## 2022-03-14 RX ORDER — TAMSULOSIN HYDROCHLORIDE 0.4 MG/1
0.4 CAPSULE ORAL DAILY
Qty: 30 CAPSULE | Refills: 5 | Status: SHIPPED | OUTPATIENT
Start: 2022-03-14 | End: 2022-04-13

## 2022-03-14 NOTE — PATIENT INSTRUCTIONS
Increase hydrochlorothiazide to 25 mg daily     Continue to monitor blood pressure     Blood work today     Follow up in 2 weeks    Restart Flomax once a day

## 2022-03-15 ENCOUNTER — TELEPHONE (OUTPATIENT)
Dept: FAMILY MEDICINE CLINIC | Facility: CLINIC | Age: 72
End: 2022-03-15

## 2022-03-15 NOTE — TELEPHONE ENCOUNTER
----- Message from MAT Rashid sent at 3/15/2022  7:30 AM CDT -----  Please notify caregivers that patient's metabolic panel shows that his potassium is normal recommend continuing potassium supplement as prescribed. His BUN (kidney function) is a little elevated-make sure patient continues to drink plenty of fluids. Also avoid NSAIDs (ibuprofen and Aleve when able).

## 2022-03-15 NOTE — TELEPHONE ENCOUNTER
Caregiver- [facility]  Phone number [phone]   There needs to be a NEW UPDATED VERBAL RELEASE ENTERED Caregiver is not on the verbal.

## 2022-03-22 NOTE — TELEPHONE ENCOUNTER
Called and spoke to shira- she is going to bring Amy Ying in later to fill out a new verbal release so she can be put on the form.  She is his primary  and is taking over all medical.

## 2022-03-28 ENCOUNTER — OFFICE VISIT (OUTPATIENT)
Dept: FAMILY MEDICINE CLINIC | Facility: CLINIC | Age: 72
End: 2022-03-28
Payer: MEDICARE

## 2022-03-28 VITALS
WEIGHT: 213 LBS | HEIGHT: 65 IN | BODY MASS INDEX: 35.49 KG/M2 | HEART RATE: 86 BPM | OXYGEN SATURATION: 99 % | RESPIRATION RATE: 16 BRPM | DIASTOLIC BLOOD PRESSURE: 78 MMHG | TEMPERATURE: 98 F | SYSTOLIC BLOOD PRESSURE: 126 MMHG

## 2022-03-28 DIAGNOSIS — I10 BENIGN ESSENTIAL HYPERTENSION: Primary | ICD-10-CM

## 2022-03-28 PROCEDURE — 99213 OFFICE O/P EST LOW 20 MIN: CPT | Performed by: NURSE PRACTITIONER

## 2022-03-28 RX ORDER — POTASSIUM CHLORIDE 750 MG/1
TABLET, EXTENDED RELEASE ORAL
COMMUNITY
Start: 2022-03-23

## 2022-03-28 NOTE — PATIENT INSTRUCTIONS
Continue same medications. Monitor blood pressure once a week.     Follow-up in June for medication check and in December for physical.

## 2022-05-09 NOTE — TELEPHONE ENCOUNTER
Future appt: Your appointments     Date & Time Appointment Department [facility])    Jun 27, 2022  9:30 AM CDT Follow Up Visit with Jeremiah Rodney, [address], [place] ([facility])        Dec 27, 2022  9:00 AM CST Medicare Annual Well Visit with Nakia Huerta MD 25 [facility], [place] ([facility])            25 [facility] [place]  [facility] [phone]  [phone]        Last Appointment with provider:   3/28/2022 for HTN follow up. Last appointment at [facility] [place]:  3/28/2022  Cholesterol, Total (mg/dL)   Date Value   12/22/2021 200 (H)     HDL Cholesterol   Date Value   12/22/2021 39 (L)   12/22/2021 39 mg/dL (L)   06/25/2019 34 mg/dL (L)     LDL Cholesterol (mg/dL)   Date Value   12/22/2021 120 (H)     Triglycerides (mg/dL)   Date Value   12/22/2021 232 (H)     Lab Results   Component Value Date     06/06/2018    A1C 5.8 (H) 06/06/2018     Lab Results   Component Value Date    T4F 1.1 12/22/2021    TSH 0.636 12/22/2021       No follow-ups on file.

## 2022-05-10 RX ORDER — HYDROCHLOROTHIAZIDE 25 MG/1
TABLET ORAL
Qty: 31 TABLET | Refills: 0 | Status: SHIPPED | OUTPATIENT
Start: 2022-05-10

## 2022-06-13 RX ORDER — POTASSIUM CHLORIDE 750 MG/1
TABLET, EXTENDED RELEASE ORAL
Qty: 90 TABLET | Refills: 1 | Status: SHIPPED | OUTPATIENT
Start: 2022-06-13

## 2022-06-13 NOTE — TELEPHONE ENCOUNTER
Future appt: Your appointments     Date & Time Appointment Department [facility])    Jun 27, 2022  9:30 AM CDT Follow Up Visit with Kermit Castillo, [address], [address] [place] ([facility])        Dec 27, 2022  9:00 AM CST Medicare Annual Well Visit with Martha Nichole MD 25 [facility] [place] ([facility])            25 [facility] [phone]        Last Appointment with provider:   3/28/2022  Last appointment at [facility] [place]:  3/28/2022  Cholesterol, Total (mg/dL)   Date Value   12/22/2021 200 (H)     HDL Cholesterol   Date Value   12/22/2021 39 (L)   12/22/2021 39 mg/dL (L)   06/25/2019 34 mg/dL (L)     LDL Cholesterol (mg/dL)   Date Value   12/22/2021 120 (H)     Triglycerides (mg/dL)   Date Value   12/22/2021 232 (H)     Lab Results   Component Value Date     06/06/2018    A1C 5.8 (H) 06/06/2018     Lab Results   Component Value Date    T4F 1.1 12/22/2021    TSH 0.636 12/22/2021       No follow-ups on file.   Last rf 3/23/22    Follow-up in June for medication check and in December for physical.

## 2022-06-27 ENCOUNTER — OFFICE VISIT (OUTPATIENT)
Dept: FAMILY MEDICINE CLINIC | Facility: CLINIC | Age: 72
End: 2022-06-27
Payer: MEDICARE

## 2022-06-27 VITALS
TEMPERATURE: 97 F | BODY MASS INDEX: 34.32 KG/M2 | HEART RATE: 84 BPM | HEIGHT: 65 IN | OXYGEN SATURATION: 97 % | WEIGHT: 206 LBS | RESPIRATION RATE: 18 BRPM | SYSTOLIC BLOOD PRESSURE: 102 MMHG | DIASTOLIC BLOOD PRESSURE: 74 MMHG

## 2022-06-27 DIAGNOSIS — I10 BENIGN ESSENTIAL HYPERTENSION: Primary | ICD-10-CM

## 2022-06-27 PROCEDURE — 99213 OFFICE O/P EST LOW 20 MIN: CPT | Performed by: NURSE PRACTITIONER

## 2022-06-27 RX ORDER — ACETAMINOPHEN 500 MG
TABLET ORAL EVERY 6 HOURS PRN
COMMUNITY

## 2022-06-27 RX ORDER — LOPERAMIDE HYDROCHLORIDE 2 MG/1
2 CAPSULE ORAL 4 TIMES DAILY PRN
COMMUNITY

## 2022-06-27 RX ORDER — TAMSULOSIN HYDROCHLORIDE 0.4 MG/1
CAPSULE ORAL
COMMUNITY
Start: 2022-06-21

## 2022-06-27 RX ORDER — IBUPROFEN 200 MG
200 TABLET ORAL EVERY 6 HOURS PRN
COMMUNITY

## 2022-06-27 NOTE — PATIENT INSTRUCTIONS
No changes to current regimen. Okay to check blood pressure once a week.   Notify office if blood pressures consistently below 110 or above 140 (top number)  Continue Physical in December with Dr. Mauricio Vyas, follow up sooner if needed

## 2022-07-08 DIAGNOSIS — I10 BENIGN ESSENTIAL HYPERTENSION: Primary | ICD-10-CM

## 2022-07-08 DIAGNOSIS — I10 BENIGN ESSENTIAL HYPERTENSION: ICD-10-CM

## 2022-07-08 NOTE — TELEPHONE ENCOUNTER
Future appt: Your appointments     Date & Time Appointment Department [facility])    Dec 27, 2022  9:00 AM CST Medicare Annual Well Visit with Neil Madrigal MD 25 [facility] [place] ([facility])            [address] [place] [address] [place]  [facility] [phone]        Last Appointment with provider:   3/28/2022  Last appointment at [facility] [place]:  6/27/2022- seen per HM- pt was advised to return in 6 months  Last px: 12/22/21    Amlodipine refilled 2/25/22 for #90, 1 refill  Cholesterol, Total (mg/dL)   Date Value   12/22/2021 200 (H)     HDL Cholesterol   Date Value   12/22/2021 39 (L)   12/22/2021 39 mg/dL (L)   06/25/2019 34 mg/dL (L)     LDL Cholesterol (mg/dL)   Date Value   12/22/2021 120 (H)     Triglycerides (mg/dL)   Date Value   12/22/2021 232 (H)     Lab Results   Component Value Date     06/06/2018    A1C 5.8 (H) 06/06/2018     Lab Results   Component Value Date    T4F 1.1 12/22/2021    TSH 0.636 12/22/2021       No follow-ups on file.

## 2022-07-08 NOTE — TELEPHONE ENCOUNTER
Future appt: Your appointments     Date & Time Appointment Department [facility])    Dec 27, 2022  9:00 AM CST Medicare Annual Well Visit with Stephanie Gibbons MD 25 [facility], [facility])            25 [facility] [phone]  [phone]        Last Appointment with provider:   6/27/2022 for HTN follow up. Last appointment at [facility] [place]:  6/27/2022  Cholesterol, Total (mg/dL)   Date Value   12/22/2021 200 (H)     HDL Cholesterol   Date Value   12/22/2021 39 (L)   12/22/2021 39 mg/dL (L)   06/25/2019 34 mg/dL (L)     LDL Cholesterol (mg/dL)   Date Value   12/22/2021 120 (H)     Triglycerides (mg/dL)   Date Value   12/22/2021 232 (H)     Lab Results   Component Value Date     06/06/2018    A1C 5.8 (H) 06/06/2018     Lab Results   Component Value Date    T4F 1.1 12/22/2021    TSH 0.636 12/22/2021       No follow-ups on file.

## 2022-07-08 NOTE — TELEPHONE ENCOUNTER
Also received fax from [facility]- refill request for Hydrochlorothiazide. Last refilled 5/10/22- for #31, 0 refills    Pt seen 6/27/22-  No changes in medication made.    See other refill request

## 2022-07-09 RX ORDER — HYDROCHLOROTHIAZIDE 25 MG/1
TABLET ORAL
Qty: 90 TABLET | Refills: 1 | Status: SHIPPED | OUTPATIENT
Start: 2022-07-09

## 2022-07-09 RX ORDER — AMLODIPINE BESYLATE 5 MG/1
TABLET ORAL
Qty: 90 TABLET | Refills: 1 | Status: SHIPPED | OUTPATIENT
Start: 2022-07-09

## 2022-08-15 DIAGNOSIS — I10 BENIGN ESSENTIAL HYPERTENSION: ICD-10-CM

## 2022-08-15 RX ORDER — TAMSULOSIN HYDROCHLORIDE 0.4 MG/1
CAPSULE ORAL
Qty: 90 CAPSULE | Refills: 1 | Status: SHIPPED | OUTPATIENT
Start: 2022-08-15

## 2022-08-15 RX ORDER — LISINOPRIL 20 MG/1
TABLET ORAL
Qty: 60 TABLET | Refills: 2 | Status: SHIPPED | OUTPATIENT
Start: 2022-08-15

## 2022-08-15 NOTE — TELEPHONE ENCOUNTER
Future appt: Your appointments     Date & Time Appointment Department [facility])    Dec 27, 2022  9:00 AM [facility] Medicare Annual Well Visit with Chula Tang MD 25 [facility], Cyn Means ([facility])            25 [facility], Brad Betancourt  [facility] [address] [phone]  [phone]        Last Appointment with provider:   3/28/2022  [facility] [place]:  6/27/2022  Cholesterol, Total (mg/dL)   Date Value   12/22/2021 200 (H)     HDL Cholesterol   Date Value   12/22/2021 39 (L)   12/22/2021 39 mg/dL (L)   06/25/2019 34 mg/dL (L)     LDL Cholesterol (mg/dL)   Date Value   12/22/2021 120 (H)     Triglycerides (mg/dL)   Date Value   12/22/2021 232 (H)     Lab Results   Component Value Date     06/06/2018    A1C 5.8 (H) 06/06/2018     Lab Results   Component Value Date    T4F 1.1 12/22/2021    TSH 0.636 12/22/2021       No follow-ups on file.

## 2022-08-15 NOTE — TELEPHONE ENCOUNTER
Future appt: Your appointments     Date & Time Appointment Department [facility])    Dec 27, 2022  9:00 AM CST Medicare Annual Well Visit with Neil Madrigal MD 25 [facility], Johnathon Puentes ([facility])            25 [facility] [place]  [facility] 1076 [phone]  [phone]        Last Appointment with provider:   3/28/2022 for HTN follow up. [facility] appointment [facility]:  6/27/2022 with  for HTN follow up. Cholesterol, Total (mg/dL)   Date Value   12/22/2021 200 (H)     HDL Cholesterol   Date Value   12/22/2021 39 (L)   12/22/2021 39 mg/dL (L)   06/25/2019 34 mg/dL (L)     LDL Cholesterol (mg/dL)   Date Value   12/22/2021 120 (H)     Triglycerides (mg/dL)   Date Value   12/22/2021 232 (H)     Lab Results   Component Value Date     06/06/2018    A1C 5.8 (H) 06/06/2018     Lab Results   Component Value Date    T4F 1.1 12/22/2021    TSH 0.636 12/22/2021       No follow-ups on file.

## 2022-11-08 DIAGNOSIS — I10 BENIGN ESSENTIAL HYPERTENSION: ICD-10-CM

## 2022-11-08 RX ORDER — LISINOPRIL 20 MG/1
TABLET ORAL
Qty: 62 TABLET | Refills: 0 | Status: SHIPPED | OUTPATIENT
Start: 2022-11-08

## 2022-11-08 NOTE — TELEPHONE ENCOUNTER
Future appt: Your appointments     Date & Time Appointment Department [facility])    Dec 27, 2022  9:00 AM CST Medicare Annual Well Visit with Neto Parker MD [address] [facility] [address], Lavon Medel ([facility])            25 [facility], Brad Betancourt  [facility] [address] [phone]        Last Appointment with provider:   Visit date not found  Last appointment [facility] [place]:  6/27/2022(HTN)-f/u 6mo(12/27/22)  Last PX-12/22/21    LISINOPRIL 20 MG Oral Tab    60tab  2refil        Filled:8/15/22 Summary: TAKE 1 TABLET BY MOUTH TWICE DAILY           Cholesterol, Total (mg/dL)   Date Value   12/22/2021 200 (H)     HDL Cholesterol   Date Value   12/22/2021 39 (L)   12/22/2021 39 mg/dL (L)   06/25/2019 34 mg/dL (L)     LDL Cholesterol (mg/dL)   Date Value   12/22/2021 120 (H)     Triglycerides (mg/dL)   Date Value   12/22/2021 232 (H)     Lab Results   Component Value Date     06/06/2018    A1C 5.8 (H) 06/06/2018     Lab Results   Component Value Date    T4F 1.1 12/22/2021    TSH 0.636 12/22/2021       No follow-ups on file.

## 2022-12-27 ENCOUNTER — LAB ENCOUNTER (OUTPATIENT)
Dept: LAB | Age: 72
End: 2022-12-27
Attending: FAMILY MEDICINE
Payer: MEDICARE

## 2022-12-27 ENCOUNTER — OFFICE VISIT (OUTPATIENT)
Dept: FAMILY MEDICINE CLINIC | Facility: CLINIC | Age: 72
End: 2022-12-27
Payer: MEDICARE

## 2022-12-27 VITALS
OXYGEN SATURATION: 97 % | WEIGHT: 220.81 LBS | BODY MASS INDEX: 37.24 KG/M2 | HEART RATE: 109 BPM | SYSTOLIC BLOOD PRESSURE: 128 MMHG | RESPIRATION RATE: 18 BRPM | HEIGHT: 64.75 IN | DIASTOLIC BLOOD PRESSURE: 72 MMHG | TEMPERATURE: 97 F

## 2022-12-27 DIAGNOSIS — E66.09 CLASS 2 OBESITY DUE TO EXCESS CALORIES WITHOUT SERIOUS COMORBIDITY WITH BODY MASS INDEX (BMI) OF 37.0 TO 37.9 IN ADULT: ICD-10-CM

## 2022-12-27 DIAGNOSIS — R73.09 OTHER ABNORMAL GLUCOSE: ICD-10-CM

## 2022-12-27 DIAGNOSIS — G89.29 CHRONIC MIDLINE LOW BACK PAIN WITH BILATERAL SCIATICA: ICD-10-CM

## 2022-12-27 DIAGNOSIS — M54.42 CHRONIC MIDLINE LOW BACK PAIN WITH BILATERAL SCIATICA: ICD-10-CM

## 2022-12-27 DIAGNOSIS — Z13.6 SCREENING FOR CARDIOVASCULAR CONDITION: ICD-10-CM

## 2022-12-27 DIAGNOSIS — E78.49 FAMILIAL MULTIPLE LIPOPROTEIN-TYPE HYPERLIPIDEMIA: ICD-10-CM

## 2022-12-27 DIAGNOSIS — M54.41 CHRONIC MIDLINE LOW BACK PAIN WITH BILATERAL SCIATICA: ICD-10-CM

## 2022-12-27 DIAGNOSIS — M51.16 LUMBAR DISC DISEASE WITH RADICULOPATHY: ICD-10-CM

## 2022-12-27 DIAGNOSIS — Z12.5 ENCOUNTER FOR SCREENING FOR MALIGNANT NEOPLASM OF PROSTATE: ICD-10-CM

## 2022-12-27 DIAGNOSIS — N40.1 BPH ASSOCIATED WITH NOCTURIA: ICD-10-CM

## 2022-12-27 DIAGNOSIS — F79 INTELLECTUAL DISABILITY: ICD-10-CM

## 2022-12-27 DIAGNOSIS — R35.1 BPH ASSOCIATED WITH NOCTURIA: ICD-10-CM

## 2022-12-27 DIAGNOSIS — Z00.00 ENCOUNTER FOR ANNUAL HEALTH EXAMINATION: Primary | ICD-10-CM

## 2022-12-27 DIAGNOSIS — Z12.11 SCREENING FOR COLON CANCER: ICD-10-CM

## 2022-12-27 DIAGNOSIS — M15.9 PRIMARY OSTEOARTHRITIS INVOLVING MULTIPLE JOINTS: ICD-10-CM

## 2022-12-27 LAB
ALBUMIN SERPL-MCNC: 3.8 G/DL (ref 3.4–5)
ALBUMIN/GLOB SERPL: 1.1 {RATIO} (ref 1–2)
ALP LIVER SERPL-CCNC: 84 U/L
ALT SERPL-CCNC: 14 U/L
ANION GAP SERPL CALC-SCNC: 4 MMOL/L (ref 0–18)
AST SERPL-CCNC: 22 U/L (ref 15–37)
BASOPHILS # BLD AUTO: 0.08 X10(3) UL (ref 0–0.2)
BASOPHILS NFR BLD AUTO: 0.7 %
BILIRUB SERPL-MCNC: 0.4 MG/DL (ref 0.1–2)
BUN BLD-MCNC: 37 MG/DL (ref 7–18)
CALCIUM BLD-MCNC: 9.1 MG/DL (ref 8.5–10.1)
CHLORIDE SERPL-SCNC: 106 MMOL/L (ref 98–112)
CHOLEST SERPL-MCNC: 205 MG/DL (ref ?–200)
CO2 SERPL-SCNC: 23 MMOL/L (ref 21–32)
COMPLEXED PSA SERPL-MCNC: 0.44 NG/ML (ref ?–4)
CREAT BLD-MCNC: 1.47 MG/DL
EOSINOPHIL # BLD AUTO: 0.36 X10(3) UL (ref 0–0.7)
EOSINOPHIL NFR BLD AUTO: 3.3 %
ERYTHROCYTE [DISTWIDTH] IN BLOOD BY AUTOMATED COUNT: 12.6 %
FASTING PATIENT LIPID ANSWER: NO
FASTING STATUS PATIENT QL REPORTED: NO
GFR SERPLBLD BASED ON 1.73 SQ M-ARVRAT: 51 ML/MIN/1.73M2 (ref 60–?)
GLOBULIN PLAS-MCNC: 3.6 G/DL (ref 2.8–4.4)
GLUCOSE BLD-MCNC: 119 MG/DL (ref 70–99)
HCT VFR BLD AUTO: 42.7 %
HDLC SERPL-MCNC: 34 MG/DL (ref 40–59)
HGB BLD-MCNC: 14.1 G/DL
IMM GRANULOCYTES # BLD AUTO: 0.07 X10(3) UL (ref 0–1)
IMM GRANULOCYTES NFR BLD: 0.6 %
LDLC SERPL CALC-MCNC: 100 MG/DL (ref ?–100)
LYMPHOCYTES # BLD AUTO: 1.9 X10(3) UL (ref 1–4)
LYMPHOCYTES NFR BLD AUTO: 17.2 %
MCH RBC QN AUTO: 31.8 PG (ref 26–34)
MCHC RBC AUTO-ENTMCNC: 33 G/DL (ref 31–37)
MCV RBC AUTO: 96.2 FL
MONOCYTES # BLD AUTO: 1.05 X10(3) UL (ref 0.1–1)
MONOCYTES NFR BLD AUTO: 9.5 %
NEUTROPHILS # BLD AUTO: 7.6 X10 (3) UL (ref 1.5–7.7)
NEUTROPHILS # BLD AUTO: 7.6 X10(3) UL (ref 1.5–7.7)
NEUTROPHILS NFR BLD AUTO: 68.7 %
NONHDLC SERPL-MCNC: 171 MG/DL (ref ?–130)
OSMOLALITY SERPL CALC.SUM OF ELEC: 286 MOSM/KG (ref 275–295)
PLATELET # BLD AUTO: 267 10(3)UL (ref 150–450)
POTASSIUM SERPL-SCNC: 4.5 MMOL/L (ref 3.5–5.1)
PROT SERPL-MCNC: 7.4 G/DL (ref 6.4–8.2)
RBC # BLD AUTO: 4.44 X10(6)UL
SODIUM SERPL-SCNC: 133 MMOL/L (ref 136–145)
TRIGL SERPL-MCNC: 423 MG/DL (ref 30–149)
TSI SER-ACNC: 1.59 MIU/ML (ref 0.36–3.74)
VLDLC SERPL CALC-MCNC: 72 MG/DL (ref 0–30)
WBC # BLD AUTO: 11.1 X10(3) UL (ref 4–11)

## 2022-12-27 PROCEDURE — 99213 OFFICE O/P EST LOW 20 MIN: CPT | Performed by: FAMILY MEDICINE

## 2022-12-27 PROCEDURE — 80053 COMPREHEN METABOLIC PANEL: CPT

## 2022-12-27 PROCEDURE — G0439 PPPS, SUBSEQ VISIT: HCPCS | Performed by: FAMILY MEDICINE

## 2022-12-27 PROCEDURE — 85025 COMPLETE CBC W/AUTO DIFF WBC: CPT

## 2022-12-27 PROCEDURE — 84443 ASSAY THYROID STIM HORMONE: CPT

## 2022-12-27 PROCEDURE — 36415 COLL VENOUS BLD VENIPUNCTURE: CPT

## 2022-12-27 PROCEDURE — 80061 LIPID PANEL: CPT

## 2022-12-27 PROCEDURE — 1125F AMNT PAIN NOTED PAIN PRSNT: CPT | Performed by: FAMILY MEDICINE

## 2023-01-09 DIAGNOSIS — I10 BENIGN ESSENTIAL HYPERTENSION: ICD-10-CM

## 2023-01-09 RX ORDER — LISINOPRIL 20 MG/1
TABLET ORAL
Qty: 62 TABLET | Refills: 5 | Status: SHIPPED | OUTPATIENT
Start: 2023-01-09

## 2023-01-09 NOTE — TELEPHONE ENCOUNTER
Future appt:    Last Appointment with provider:   Visit date not found  Last appointment at [facility]:  12/27/2022(PX)-f/u 6mo(6/27/23)    LISINOPRIL 20 MG Oral Tab    62tab  0refill        Filled:11/8/22 Summary: TAKE 1 TABLET BY MOUTH TWICE DAILY          Cholesterol, Total (mg/dL)   Date Value   12/27/2022 205 (H)     HDL Cholesterol (mg/dL)   Date Value   12/27/2022 34 (L)     LDL Cholesterol (mg/dL)   Date Value   12/27/2022 100 (H)     Triglycerides (mg/dL)   Date Value   12/27/2022 423 (H)     Lab Results   Component Value Date     06/06/2018    A1C 5.8 (H) 06/06/2018     Lab Results   Component Value Date    T4F 1.1 12/22/2021    TSH 1.590 12/27/2022       No follow-ups on file.

## 2023-01-30 ENCOUNTER — PATIENT MESSAGE (OUTPATIENT)
Dept: FAMILY MEDICINE CLINIC | Facility: CLINIC | Age: 73
End: 2023-01-30

## 2023-01-30 NOTE — TELEPHONE ENCOUNTER
The allergy list on his electronic medical record lists aspirin and said that he had hives associated with this. I have reviewed our records and this appears to be a recent addition to the allergy list.  I do not know what prompted that reaction and I was not aware that he had an allergy to aspirin. However, hives associated with aspirin is a serious type of reaction and means that he should avoid ibuprofen in the future.

## 2023-01-30 NOTE — TELEPHONE ENCOUNTER
From: Viktoria Ga  To: Maral Hernandez MD  Sent: 1/30/2023 1:51 PM CST  Subject: Allergy to IBUProfen? In Eduar's file that we have, it says he should avoid Aspirin because of possible allergic reactions. Do you know the history behind this? The Physician Order Sheet we have lists IBUProfen as a pain medication he can take.

## 2023-01-31 ENCOUNTER — PATIENT MESSAGE (OUTPATIENT)
Dept: FAMILY MEDICINE CLINIC | Facility: CLINIC | Age: 73
End: 2023-01-31

## 2023-02-21 ENCOUNTER — TELEPHONE (OUTPATIENT)
Dept: FAMILY MEDICINE CLINIC | Facility: CLINIC | Age: 73
End: 2023-02-21

## 2023-03-31 ENCOUNTER — PATIENT MESSAGE (OUTPATIENT)
Dept: FAMILY MEDICINE CLINIC | Facility: CLINIC | Age: 73
End: 2023-03-31

## 2023-03-31 DIAGNOSIS — R39.81 FUNCTIONAL URINARY INCONTINENCE: ICD-10-CM

## 2023-03-31 DIAGNOSIS — R15.9 FULL INCONTINENCE OF FECES: Primary | ICD-10-CM

## 2023-03-31 RX ORDER — DIAPER,BRIEF,ADULT, DISPOSABLE
99 EACH MISCELLANEOUS EVERY 2 HOUR PRN
Qty: 100 EACH | Refills: 5 | Status: SHIPPED | OUTPATIENT
Start: 2023-03-31 | End: 2023-03-31

## 2023-03-31 RX ORDER — DIAPER,BRIEF,ADULT, DISPOSABLE
99 EACH MISCELLANEOUS EVERY 2 HOUR PRN
COMMUNITY
End: 2023-03-31

## 2023-03-31 RX ORDER — DIAPER,BRIEF,ADULT, DISPOSABLE
EACH MISCELLANEOUS
Qty: 100 EACH | Refills: 5 | Status: SHIPPED | OUTPATIENT
Start: 2023-03-31

## 2023-03-31 NOTE — TELEPHONE ENCOUNTER
From: Alfred White  To: Millicent Limon MD  Sent: 3/31/2023 8:14 AM CDT  Subject: Incontinence issues    Is it possible to get Irina Saeed a prescription for Depends? He is having incontinence issues. He did see Dr. Pool Hernandes about this.

## 2023-04-10 DIAGNOSIS — I10 BENIGN ESSENTIAL HYPERTENSION: ICD-10-CM

## 2023-04-10 RX ORDER — AMLODIPINE BESYLATE 5 MG/1
5 TABLET ORAL DAILY
Qty: 31 TABLET | Refills: 11 | Status: SHIPPED | OUTPATIENT
Start: 2023-04-10

## 2023-04-10 NOTE — TELEPHONE ENCOUNTER
Future appt:    Last Appointment with provider:  12/27/2022; Return in 6 months (on 6/27/2023). Last appointment at [facility]:  12/27/2022  Cholesterol, Total (mg/dL)   Date Value   12/27/2022 205 (H)     HDL Cholesterol (mg/dL)   Date Value   12/27/2022 34 (L)     LDL Cholesterol (mg/dL)   Date Value   12/27/2022 100 (H)     Triglycerides (mg/dL)   Date Value   12/27/2022 423 (H)     Lab Results   Component Value Date     06/06/2018    A1C 5.8 (H) 06/06/2018     Lab Results   Component Value Date    T4F 1.1 12/22/2021    TSH 1.590 12/27/2022     Last RF:  7/9/2022    No follow-ups on file.

## 2023-05-06 DIAGNOSIS — E87.6 LOW POTASSIUM SYNDROME: Primary | ICD-10-CM

## 2023-05-08 RX ORDER — TAMSULOSIN HYDROCHLORIDE 0.4 MG/1
CAPSULE ORAL
Qty: 31 CAPSULE | Refills: 2 | Status: SHIPPED | OUTPATIENT
Start: 2023-05-08

## 2023-05-08 NOTE — TELEPHONE ENCOUNTER
Future appt:    Last Appointment with provider:   Visit date not found  Last appointment at [facility]:  12/27/2022(ASHLIE w/ sammi)-f/u 6mo(6/27/23)    POTASSIUM CHLORIDE 10 MEQ Oral Tab CR    90tab  1refill        Filled:6/13/22 Summary: TAKE 1 TABLET BY MOUTH ONCE DAILY (A)          Cholesterol, Total (mg/dL)   Date Value   12/27/2022 205 (H)     HDL Cholesterol (mg/dL)   Date Value   12/27/2022 34 (L)     LDL Cholesterol (mg/dL)   Date Value   12/27/2022 100 (H)     Triglycerides (mg/dL)   Date Value   12/27/2022 423 (H)     Lab Results   Component Value Date     06/06/2018    A1C 5.8 (H) 06/06/2018     Lab Results   Component Value Date    T4F 1.1 12/22/2021    TSH 1.590 12/27/2022       No follow-ups on file.

## 2023-05-08 NOTE — TELEPHONE ENCOUNTER
Tamsulosin: 8/15/22   Return in 6 months (on 6/27/2023). Future appt:    Last Appointment with provider:   12/27/2022  Last appointment at [facility]:  12/27/2022  Cholesterol, Total (mg/dL)   Date Value   12/27/2022 205 (H)     HDL Cholesterol (mg/dL)   Date Value   12/27/2022 34 (L)     LDL Cholesterol (mg/dL)   Date Value   12/27/2022 100 (H)     Triglycerides (mg/dL)   Date Value   12/27/2022 423 (H)     Lab Results   Component Value Date     06/06/2018    A1C 5.8 (H) 06/06/2018     Lab Results   Component Value Date    T4F 1.1 12/22/2021    TSH 1.590 12/27/2022       No follow-ups on file.

## 2023-05-09 RX ORDER — POTASSIUM CHLORIDE 750 MG/1
TABLET, EXTENDED RELEASE ORAL
Qty: 31 TABLET | Refills: 1 | Status: SHIPPED | OUTPATIENT
Start: 2023-05-09

## 2023-05-09 RX ORDER — OMEPRAZOLE 20 MG/1
CAPSULE, DELAYED RELEASE ORAL
Qty: 31 CAPSULE | Refills: 1 | Status: SHIPPED | OUTPATIENT
Start: 2023-05-09

## 2023-05-17 ENCOUNTER — PATIENT MESSAGE (OUTPATIENT)
Dept: FAMILY MEDICINE CLINIC | Facility: CLINIC | Age: 73
End: 2023-05-17

## 2023-05-17 DIAGNOSIS — L60.2 LONG TOENAIL: Primary | ICD-10-CM

## 2023-05-17 DIAGNOSIS — M21.619 BUNION: ICD-10-CM

## 2023-05-17 NOTE — TELEPHONE ENCOUNTER
From: Shelly Tidwell  To: Mago Olivares MD  Sent: 5/17/2023 9:18 AM CDT  Subject: Podiatrist referral    Good morning,  Jose Antonio Lunsford got a cut on his foot and when he showed it to me, I noticed his toe nails are quite long and he has a couple of bunions. He said he has trouble cutting his nails sometimes. I would like his feet looked at. Does he need a referral from you to see a podiatrist? If so, the referral can be faxed to [facility] at [phone]. Most of our clients go and see Dr. Ramesh Flores.

## 2023-07-28 DIAGNOSIS — I10 BENIGN ESSENTIAL HYPERTENSION: ICD-10-CM

## 2023-07-28 RX ORDER — HYDROCHLOROTHIAZIDE 25 MG/1
TABLET ORAL
Qty: 90 TABLET | Refills: 0 | Status: SHIPPED | OUTPATIENT
Start: 2023-07-28

## 2023-07-28 NOTE — TELEPHONE ENCOUNTER
HCtZ:  7/9/22  Return in 6 months (on 6/27/2023). Future appt:    Last Appointment with provider:  12/27/22  Last appointment at [facility]:  Visit date not found  Cholesterol, Total (mg/dL)   Date Value   12/27/2022 205 (H)     HDL Cholesterol (mg/dL)   Date Value   12/27/2022 34 (L)     LDL Cholesterol (mg/dL)   Date Value   12/27/2022 100 (H)     Triglycerides (mg/dL)   Date Value   12/27/2022 423 (H)     Lab Results   Component Value Date     06/06/2018    A1C 5.8 (H) 06/06/2018     Lab Results   Component Value Date    T4F 1.1 12/22/2021    TSH 1.590 12/27/2022       No follow-ups on file.

## 2023-08-01 DIAGNOSIS — I10 BENIGN ESSENTIAL HYPERTENSION: ICD-10-CM

## 2023-08-01 RX ORDER — HYDROCHLOROTHIAZIDE 25 MG/1
TABLET ORAL
Qty: 90 TABLET | Refills: 1 | Status: SHIPPED | OUTPATIENT
Start: 2023-08-01

## 2023-08-12 DIAGNOSIS — E87.6 LOW POTASSIUM SYNDROME: ICD-10-CM

## 2023-08-12 DIAGNOSIS — I10 BENIGN ESSENTIAL HYPERTENSION: ICD-10-CM

## 2023-08-14 NOTE — TELEPHONE ENCOUNTER
Pt due for F/U, [facility]     Future appt:    Last Appointment with provider:   12/27/22(PX)-f/u 6mo(6/2723)  Last appointment at [facility]:  Visit date not found  Cholesterol, Total (mg/dL)   Date Value   12/27/2022 205 (H)     HDL Cholesterol (mg/dL)   Date Value   12/27/2022 34 (L)     LDL Cholesterol (mg/dL)   Date Value   12/27/2022 100 (H)     Triglycerides (mg/dL)   Date Value   12/27/2022 423 (H)     Lab Results   Component Value Date     06/06/2018    A1C 5.8 (H) 06/06/2018     Lab Results   Component Value Date    T4F 1.1 12/22/2021    TSH 1.590 12/27/2022       No follow-ups on file.

## 2023-08-15 RX ORDER — LISINOPRIL 20 MG/1
20 TABLET ORAL 2 TIMES DAILY
Qty: 62 TABLET | Refills: 11 | Status: SHIPPED | OUTPATIENT
Start: 2023-08-15

## 2023-08-15 RX ORDER — POTASSIUM CHLORIDE 750 MG/1
10 TABLET, EXTENDED RELEASE ORAL DAILY
Qty: 31 TABLET | Refills: 11 | Status: SHIPPED | OUTPATIENT
Start: 2023-08-15

## 2023-08-15 RX ORDER — OMEPRAZOLE 20 MG/1
20 CAPSULE, DELAYED RELEASE ORAL DAILY
Qty: 31 CAPSULE | Refills: 11 | Status: SHIPPED | OUTPATIENT
Start: 2023-08-15

## 2023-08-22 ENCOUNTER — PATIENT MESSAGE (OUTPATIENT)
Dept: FAMILY MEDICINE CLINIC | Facility: CLINIC | Age: 73
End: 2023-08-22

## 2023-08-22 NOTE — TELEPHONE ENCOUNTER
From: Tony King  To: Bridget Segal MD  Sent: 8/22/2023 1:41 PM CDT  Subject: GI issues    Marv Horan had a colonoscopy done a few months ago. Dr. Chayo Bradley did not see any issues which is good but Marv Horan is still having GI issues. I'm not sure how often but at least a couple of times a month, if not weekly, Marv Horan is having diarrhea.  What other tests, exams, etc. can be done to figure out what is going on?

## 2023-08-23 RX ORDER — MONTELUKAST SODIUM 4 MG/1
1 TABLET, CHEWABLE ORAL DAILY
Qty: 30 TABLET | Refills: 11 | Status: SHIPPED | OUTPATIENT
Start: 2023-08-23

## 2023-08-23 NOTE — TELEPHONE ENCOUNTER
The good news is that the diarrhea does not appear to be caused by a significant medical problem. There is no sign of severe abnormality that is causing it. Unfortunately, this means that there is not going to be a simple or easy fix for it. We can try oral colestipol once a day and see if that is effective in controlling his symptoms. I will send in a prescription for it.

## 2023-09-08 ENCOUNTER — OFFICE VISIT (OUTPATIENT)
Dept: FAMILY MEDICINE CLINIC | Facility: CLINIC | Age: 73
End: 2023-09-08
Payer: MEDICARE

## 2023-09-08 VITALS
DIASTOLIC BLOOD PRESSURE: 68 MMHG | RESPIRATION RATE: 16 BRPM | OXYGEN SATURATION: 97 % | SYSTOLIC BLOOD PRESSURE: 114 MMHG | HEIGHT: 64.75 IN | HEART RATE: 95 BPM | BODY MASS INDEX: 35.75 KG/M2 | TEMPERATURE: 97 F | WEIGHT: 212 LBS

## 2023-09-08 DIAGNOSIS — R73.09 OTHER ABNORMAL GLUCOSE: ICD-10-CM

## 2023-09-08 DIAGNOSIS — M54.42 CHRONIC MIDLINE LOW BACK PAIN WITH BILATERAL SCIATICA: ICD-10-CM

## 2023-09-08 DIAGNOSIS — M54.41 CHRONIC MIDLINE LOW BACK PAIN WITH BILATERAL SCIATICA: ICD-10-CM

## 2023-09-08 DIAGNOSIS — E78.49 FAMILIAL MULTIPLE LIPOPROTEIN-TYPE HYPERLIPIDEMIA: ICD-10-CM

## 2023-09-08 DIAGNOSIS — G89.29 CHRONIC MIDLINE LOW BACK PAIN WITH BILATERAL SCIATICA: ICD-10-CM

## 2023-09-08 DIAGNOSIS — I10 BENIGN ESSENTIAL HYPERTENSION: Primary | ICD-10-CM

## 2023-09-08 LAB
CARTRIDGE LOT#: NORMAL NUMERIC
HEMOGLOBIN A1C: 5.2 % (ref 4.3–5.6)

## 2023-09-08 PROCEDURE — 99214 OFFICE O/P EST MOD 30 MIN: CPT | Performed by: FAMILY MEDICINE

## 2023-09-08 PROCEDURE — 83036 HEMOGLOBIN GLYCOSYLATED A1C: CPT | Performed by: FAMILY MEDICINE

## 2023-09-08 RX ORDER — IBUPROFEN 200 MG
200 TABLET ORAL EVERY 8 HOURS PRN
COMMUNITY

## 2023-09-08 RX ORDER — POLYETHYLENE GLYCOL 3350 17 G/17G
POWDER, FOR SOLUTION ORAL
COMMUNITY
Start: 2023-04-24

## 2023-09-08 RX ORDER — BISACODYL 5 MG
5 TABLET, DELAYED RELEASE (ENTERIC COATED) ORAL
COMMUNITY
Start: 2023-05-05

## 2023-09-08 NOTE — PATIENT INSTRUCTIONS
Blood pressure stable today. Advice low salt diet and exercise. Monitor your blood pressure. Return to clinic if systolic blood pressure more than 722 or diastolic more than 889. Recommend low-carb diet, exercise and weight loss. Recommend heating pad as needed   Use tylenol as needed   Follow up with Dr Ming Tavarez if no improvement on back pain.

## 2023-09-12 RX ORDER — TAMSULOSIN HYDROCHLORIDE 0.4 MG/1
0.4 CAPSULE ORAL DAILY
Qty: 30 CAPSULE | Refills: 11 | Status: SHIPPED | OUTPATIENT
Start: 2023-09-12

## (undated) NOTE — MR AVS SNAPSHOT
[address]  [address] [id number] [phone]               Thank you for choosing us for your health care visit with Zana Mcgee MD.  We are glad to serve you and happy to provide you with this summary of - AmLODIPine Besylate 5 MG Tabs  - lisinopril 20 MG Tabs            MyChart     Sign up for [employer], your secure online medical record.   [employer] will allow you to access patient instructions from your recent visit,  view other health information, and more

## (undated) NOTE — LETTER
Date: 2/7/2020    Patient Name: Josefina Schmitt          To Whom it may concern: This letter has been written at the patient's request. The above patient was seen at the [facility] for treatment of a medical condition.     This patient tayloru

## (undated) NOTE — LETTER
02/24/21        Gilbert Novak      Dear Oriana Tamez records indicate that you have outstanding lab work and or testing that was ordered for you and has not yet been completed:  Orders Placed This Enc

## (undated) NOTE — LETTER
Date: 1/30/2020    Patient Name: Diego Garcia          To Whom it may concern: This letter has been written at the patient's request. The above patient was seen at the [facility] for treatment of a medical condition.     This patient taylor

## (undated) NOTE — LETTER
01/31/22        Gilbert Novak      Dear Vipin Riggins records indicate that you have outstanding lab work and or testing that was ordered for you and has not yet been completed:  Orders Placed This Enc

## (undated) NOTE — MR AVS SNAPSHOT
[address]  [place] [phone]  [phone]               Thank you for choosing us for your health care visit with Andria Monte MD.  We are glad to serve you and happy to provide you with this summary of Order:  Surgery - External    [facility]ina   [address] [address]   Phone:  [phone]   Fax:  [phone]         Referral Orders      Normal Orders This Visit    SURGERY - EXTERNAL [977165 CPT(R)]  Order #:  [id number] Sister will call for an appointment. This inguinal hernia may be causing some constipation and possibly some urinary symptoms also. 3  Hypertension: Needs better control. Increase lisinopril to 20 mg daily. Continue with hydrochlorothiazide.   Recheck Sign up for [employer], your secure online medical record. [employer] will allow you to access patient instructions from your recent visit,  view other health information, and more. To sign up or find more information, go to [email or web address]

## (undated) NOTE — MR AVS SNAPSHOT
[address]  [place] [address] [phone]  [phone]               Thank you for choosing us for your health care visit with Venkat Birch MD.  We are glad to serve you and happy to provide you with this summary of [employer] will allow you to access patient instructions from your recent visit,  view other health information, and more. To sign up or find more information, go to [email or web address] and click on the Sign Up Now link in the [employer] box.      Enter

## (undated) NOTE — MR AVS SNAPSHOT
[address]  [address] [address] [phone]               Thank you for choosing us for your health care visit with Marguerite Bhatia MD.  We are glad to serve you and happy to provide you with this summary of Assoc Dx: Anemia, unspecified type [X09.9]           Folic Acid Serum (Folate)    Complete by:  Apr 28, 2017 (Approximate)    Assoc Dx: Anemia, unspecified type [D64.9]           TSH [E]    Complete by:  Apr 28, 2017 (Approximate)    Assoc Dx:   Health c be possible that he could have this repaired as well at the same time on May 16. Blood pressure is still elevated today. I would like run to increase his lisinopril to taking it twice a day.   I would like to check his blood pressure again in 2 weeks martha Don’t eat while distracted and slow down. Avoid over sized portions. Don’t eat while when you’re bored.      EAT THESE FOODS MORE OFTEN: EAT THESE FOODS LESS OFTEN:   Make half your plate fruits and vegetables Highly refined, white starches including wh

## (undated) NOTE — LETTER
20    Re:  Bethany Cook  :  11/3/1950    Dear Georgiann Councilman was seen in our office in 2020 with severe low back pain. He is unable to work due to the severity of his pain.   He should remain off work through  in order

## (undated) NOTE — LETTER
[address], [address]  [phone]            July 1, 2019      Stephane Saeed  [address]  [address]      Dear Phil Melendez:     Our office has been trying to

## (undated) NOTE — LETTER
10/26/20        Keesha Ask  [address]      Dear Nahun Dumont,    Our records indicate that you have outstanding lab work and or testing that was ordered for you and has not yet been completed:  Orders Placed This Encou